# Patient Record
Sex: MALE | Race: WHITE | NOT HISPANIC OR LATINO | Employment: OTHER | URBAN - NONMETROPOLITAN AREA
[De-identification: names, ages, dates, MRNs, and addresses within clinical notes are randomized per-mention and may not be internally consistent; named-entity substitution may affect disease eponyms.]

---

## 2024-08-07 ENCOUNTER — APPOINTMENT (OUTPATIENT)
Dept: CT IMAGING | Facility: HOSPITAL | Age: 49
End: 2024-08-07

## 2024-08-07 ENCOUNTER — APPOINTMENT (OUTPATIENT)
Dept: GENERAL RADIOLOGY | Facility: HOSPITAL | Age: 49
End: 2024-08-07

## 2024-08-07 ENCOUNTER — HOSPITAL ENCOUNTER (EMERGENCY)
Facility: HOSPITAL | Age: 49
Discharge: OTHER INSTITUTION | End: 2024-08-08
Attending: STUDENT IN AN ORGANIZED HEALTH CARE EDUCATION/TRAINING PROGRAM
Payer: MEDICAID

## 2024-08-07 DIAGNOSIS — L02.31 ABSCESS, GLUTEAL, RIGHT: Primary | ICD-10-CM

## 2024-08-07 DIAGNOSIS — A41.9 SEPSIS, DUE TO UNSPECIFIED ORGANISM, UNSPECIFIED WHETHER ACUTE ORGAN DYSFUNCTION PRESENT: ICD-10-CM

## 2024-08-07 DIAGNOSIS — N28.9 ACUTE RENAL INSUFFICIENCY: ICD-10-CM

## 2024-08-07 DIAGNOSIS — R07.9 ACUTE CHEST PAIN: ICD-10-CM

## 2024-08-07 LAB
ALBUMIN SERPL-MCNC: 3.8 G/DL (ref 3.5–5.2)
ALBUMIN/GLOB SERPL: 1.1 G/DL
ALP SERPL-CCNC: 56 U/L (ref 39–117)
ALT SERPL W P-5'-P-CCNC: 53 U/L (ref 1–41)
AMPHET+METHAMPHET UR QL: NEGATIVE
AMPHETAMINES UR QL: NEGATIVE
ANION GAP SERPL CALCULATED.3IONS-SCNC: 13.3 MMOL/L (ref 5–15)
AST SERPL-CCNC: 55 U/L (ref 1–40)
BARBITURATES UR QL SCN: NEGATIVE
BASOPHILS # BLD AUTO: 0.12 10*3/MM3 (ref 0–0.2)
BASOPHILS NFR BLD AUTO: 0.4 % (ref 0–1.5)
BENZODIAZ UR QL SCN: NEGATIVE
BILIRUB SERPL-MCNC: 0.5 MG/DL (ref 0–1.2)
BUN SERPL-MCNC: 14 MG/DL (ref 6–20)
BUN/CREAT SERPL: 7.8 (ref 7–25)
BUPRENORPHINE SERPL-MCNC: NEGATIVE NG/ML
CALCIUM SPEC-SCNC: 9 MG/DL (ref 8.6–10.5)
CANNABINOIDS SERPL QL: POSITIVE
CHLORIDE SERPL-SCNC: 95 MMOL/L (ref 98–107)
CK SERPL-CCNC: 522 U/L (ref 20–200)
CO2 SERPL-SCNC: 25.7 MMOL/L (ref 22–29)
COCAINE UR QL: NEGATIVE
CREAT SERPL-MCNC: 1.8 MG/DL (ref 0.76–1.27)
D-LACTATE SERPL-SCNC: 1.4 MMOL/L (ref 0.5–2)
DEPRECATED RDW RBC AUTO: 49.4 FL (ref 37–54)
EGFRCR SERPLBLD CKD-EPI 2021: 45.6 ML/MIN/1.73
EOSINOPHIL # BLD AUTO: 0.5 10*3/MM3 (ref 0–0.4)
EOSINOPHIL NFR BLD AUTO: 1.8 % (ref 0.3–6.2)
ERYTHROCYTE [DISTWIDTH] IN BLOOD BY AUTOMATED COUNT: 17.3 % (ref 12.3–15.4)
FENTANYL UR-MCNC: POSITIVE NG/ML
GEN 5 2HR TROPONIN T REFLEX: 14 NG/L
GLOBULIN UR ELPH-MCNC: 3.6 GM/DL
GLUCOSE SERPL-MCNC: 105 MG/DL (ref 65–99)
HCT VFR BLD AUTO: 40.4 % (ref 37.5–51)
HGB BLD-MCNC: 13.8 G/DL (ref 13–17.7)
HOLD SPECIMEN: NORMAL
HOLD SPECIMEN: NORMAL
IMM GRANULOCYTES # BLD AUTO: 0.36 10*3/MM3 (ref 0–0.05)
IMM GRANULOCYTES NFR BLD AUTO: 1.3 % (ref 0–0.5)
LYMPHOCYTES # BLD AUTO: 2.07 10*3/MM3 (ref 0.7–3.1)
LYMPHOCYTES NFR BLD AUTO: 7.3 % (ref 19.6–45.3)
MAGNESIUM SERPL-MCNC: 1.7 MG/DL (ref 1.6–2.6)
MCH RBC QN AUTO: 27.7 PG (ref 26.6–33)
MCHC RBC AUTO-ENTMCNC: 34.2 G/DL (ref 31.5–35.7)
MCV RBC AUTO: 81 FL (ref 79–97)
METHADONE UR QL SCN: NEGATIVE
MONOCYTES # BLD AUTO: 2.23 10*3/MM3 (ref 0.1–0.9)
MONOCYTES NFR BLD AUTO: 7.9 % (ref 5–12)
NEUTROPHILS NFR BLD AUTO: 23.05 10*3/MM3 (ref 1.7–7)
NEUTROPHILS NFR BLD AUTO: 81.3 % (ref 42.7–76)
NRBC BLD AUTO-RTO: 0 /100 WBC (ref 0–0.2)
OPIATES UR QL: NEGATIVE
OXYCODONE UR QL SCN: NEGATIVE
PCP UR QL SCN: NEGATIVE
PLATELET # BLD AUTO: 432 10*3/MM3 (ref 140–450)
PMV BLD AUTO: 9.3 FL (ref 6–12)
POTASSIUM SERPL-SCNC: 4 MMOL/L (ref 3.5–5.2)
PROT SERPL-MCNC: 7.4 G/DL (ref 6–8.5)
RBC # BLD AUTO: 4.99 10*6/MM3 (ref 4.14–5.8)
SODIUM SERPL-SCNC: 134 MMOL/L (ref 136–145)
T4 FREE SERPL-MCNC: 1.34 NG/DL (ref 0.92–1.68)
TRICYCLICS UR QL SCN: NEGATIVE
TROPONIN T DELTA: -1 NG/L
TROPONIN T SERPL HS-MCNC: 15 NG/L
TSH SERPL DL<=0.05 MIU/L-ACNC: 1.32 UIU/ML (ref 0.27–4.2)
WBC NRBC COR # BLD AUTO: 28.33 10*3/MM3 (ref 3.4–10.8)
WHOLE BLOOD HOLD COAG: NORMAL
WHOLE BLOOD HOLD SPECIMEN: NORMAL

## 2024-08-07 PROCEDURE — 85025 COMPLETE CBC W/AUTO DIFF WBC: CPT | Performed by: STUDENT IN AN ORGANIZED HEALTH CARE EDUCATION/TRAINING PROGRAM

## 2024-08-07 PROCEDURE — 71275 CT ANGIOGRAPHY CHEST: CPT

## 2024-08-07 PROCEDURE — 25010000002 VANCOMYCIN 5 G RECONSTITUTED SOLUTION: Performed by: STUDENT IN AN ORGANIZED HEALTH CARE EDUCATION/TRAINING PROGRAM

## 2024-08-07 PROCEDURE — 83735 ASSAY OF MAGNESIUM: CPT | Performed by: STUDENT IN AN ORGANIZED HEALTH CARE EDUCATION/TRAINING PROGRAM

## 2024-08-07 PROCEDURE — 25010000002 CEFTRIAXONE PER 250 MG: Performed by: STUDENT IN AN ORGANIZED HEALTH CARE EDUCATION/TRAINING PROGRAM

## 2024-08-07 PROCEDURE — 25510000001 IOPAMIDOL 61 % SOLUTION: Performed by: STUDENT IN AN ORGANIZED HEALTH CARE EDUCATION/TRAINING PROGRAM

## 2024-08-07 PROCEDURE — 96367 TX/PROPH/DG ADDL SEQ IV INF: CPT

## 2024-08-07 PROCEDURE — 25010000002 MORPHINE PER 10 MG: Performed by: STUDENT IN AN ORGANIZED HEALTH CARE EDUCATION/TRAINING PROGRAM

## 2024-08-07 PROCEDURE — 87040 BLOOD CULTURE FOR BACTERIA: CPT | Performed by: STUDENT IN AN ORGANIZED HEALTH CARE EDUCATION/TRAINING PROGRAM

## 2024-08-07 PROCEDURE — 84484 ASSAY OF TROPONIN QUANT: CPT | Performed by: STUDENT IN AN ORGANIZED HEALTH CARE EDUCATION/TRAINING PROGRAM

## 2024-08-07 PROCEDURE — 96375 TX/PRO/DX INJ NEW DRUG ADDON: CPT

## 2024-08-07 PROCEDURE — 84443 ASSAY THYROID STIM HORMONE: CPT | Performed by: STUDENT IN AN ORGANIZED HEALTH CARE EDUCATION/TRAINING PROGRAM

## 2024-08-07 PROCEDURE — 99285 EMERGENCY DEPT VISIT HI MDM: CPT

## 2024-08-07 PROCEDURE — 83605 ASSAY OF LACTIC ACID: CPT | Performed by: STUDENT IN AN ORGANIZED HEALTH CARE EDUCATION/TRAINING PROGRAM

## 2024-08-07 PROCEDURE — 80053 COMPREHEN METABOLIC PANEL: CPT | Performed by: STUDENT IN AN ORGANIZED HEALTH CARE EDUCATION/TRAINING PROGRAM

## 2024-08-07 PROCEDURE — 71045 X-RAY EXAM CHEST 1 VIEW: CPT

## 2024-08-07 PROCEDURE — 25810000003 SODIUM CHLORIDE 0.9 % SOLUTION: Performed by: STUDENT IN AN ORGANIZED HEALTH CARE EDUCATION/TRAINING PROGRAM

## 2024-08-07 PROCEDURE — 96365 THER/PROPH/DIAG IV INF INIT: CPT

## 2024-08-07 PROCEDURE — 93005 ELECTROCARDIOGRAM TRACING: CPT | Performed by: STUDENT IN AN ORGANIZED HEALTH CARE EDUCATION/TRAINING PROGRAM

## 2024-08-07 PROCEDURE — 82550 ASSAY OF CK (CPK): CPT | Performed by: STUDENT IN AN ORGANIZED HEALTH CARE EDUCATION/TRAINING PROGRAM

## 2024-08-07 PROCEDURE — 80307 DRUG TEST PRSMV CHEM ANLYZR: CPT | Performed by: STUDENT IN AN ORGANIZED HEALTH CARE EDUCATION/TRAINING PROGRAM

## 2024-08-07 PROCEDURE — 36415 COLL VENOUS BLD VENIPUNCTURE: CPT

## 2024-08-07 PROCEDURE — 72193 CT PELVIS W/DYE: CPT

## 2024-08-07 PROCEDURE — 84439 ASSAY OF FREE THYROXINE: CPT | Performed by: STUDENT IN AN ORGANIZED HEALTH CARE EDUCATION/TRAINING PROGRAM

## 2024-08-07 RX ORDER — ASPIRIN 325 MG
325 TABLET ORAL ONCE
Status: COMPLETED | OUTPATIENT
Start: 2024-08-07 | End: 2024-08-07

## 2024-08-07 RX ORDER — VANCOMYCIN 2 GRAM/500 ML IN 0.9 % SODIUM CHLORIDE INTRAVENOUS
20 ONCE
Status: COMPLETED | OUTPATIENT
Start: 2024-08-07 | End: 2024-08-08

## 2024-08-07 RX ORDER — SODIUM CHLORIDE 0.9 % (FLUSH) 0.9 %
10 SYRINGE (ML) INJECTION AS NEEDED
Status: DISCONTINUED | OUTPATIENT
Start: 2024-08-07 | End: 2024-08-08 | Stop reason: HOSPADM

## 2024-08-07 RX ADMIN — VANCOMYCIN HYDROCHLORIDE 2000 MG: 5 INJECTION, POWDER, LYOPHILIZED, FOR SOLUTION INTRAVENOUS at 23:31

## 2024-08-07 RX ADMIN — SODIUM CHLORIDE 2000 MG: 9 INJECTION, SOLUTION INTRAVENOUS at 22:30

## 2024-08-07 RX ADMIN — IOPAMIDOL 100 ML: 612 INJECTION, SOLUTION INTRAVENOUS at 21:46

## 2024-08-07 RX ADMIN — ASPIRIN 325 MG: 325 TABLET ORAL at 21:27

## 2024-08-07 RX ADMIN — SODIUM CHLORIDE 1000 ML: 9 INJECTION, SOLUTION INTRAVENOUS at 21:27

## 2024-08-07 RX ADMIN — MORPHINE SULFATE 4 MG: 4 INJECTION, SOLUTION INTRAMUSCULAR; INTRAVENOUS at 22:28

## 2024-08-08 ENCOUNTER — HOSPITAL ENCOUNTER (EMERGENCY)
Facility: HOSPITAL | Age: 49
Discharge: HOME | End: 2024-08-08
Attending: STUDENT IN AN ORGANIZED HEALTH CARE EDUCATION/TRAINING PROGRAM
Payer: MEDICAID

## 2024-08-08 VITALS
TEMPERATURE: 98.1 F | SYSTOLIC BLOOD PRESSURE: 156 MMHG | BODY MASS INDEX: 32.96 KG/M2 | RESPIRATION RATE: 18 BRPM | DIASTOLIC BLOOD PRESSURE: 89 MMHG | HEIGHT: 67 IN | WEIGHT: 210 LBS | OXYGEN SATURATION: 97 % | HEART RATE: 93 BPM

## 2024-08-08 VITALS
WEIGHT: 210 LBS | HEART RATE: 96 BPM | BODY MASS INDEX: 32.96 KG/M2 | HEIGHT: 67 IN | OXYGEN SATURATION: 96 % | SYSTOLIC BLOOD PRESSURE: 164 MMHG | RESPIRATION RATE: 18 BRPM | DIASTOLIC BLOOD PRESSURE: 87 MMHG | TEMPERATURE: 98.9 F

## 2024-08-08 DIAGNOSIS — Z48.89 ENCOUNTER FOR POST SURGICAL WOUND CHECK: Primary | ICD-10-CM

## 2024-08-08 PROCEDURE — 96376 TX/PRO/DX INJ SAME DRUG ADON: CPT

## 2024-08-08 PROCEDURE — 36415 COLL VENOUS BLD VENIPUNCTURE: CPT

## 2024-08-08 PROCEDURE — 99282 EMERGENCY DEPT VISIT SF MDM: CPT

## 2024-08-08 PROCEDURE — 25010000002 MORPHINE PER 10 MG: Performed by: STUDENT IN AN ORGANIZED HEALTH CARE EDUCATION/TRAINING PROGRAM

## 2024-08-08 PROCEDURE — 25810000003 SODIUM CHLORIDE 0.9 % SOLUTION: Performed by: STUDENT IN AN ORGANIZED HEALTH CARE EDUCATION/TRAINING PROGRAM

## 2024-08-08 RX ADMIN — MORPHINE SULFATE 4 MG: 4 INJECTION, SOLUTION INTRAMUSCULAR; INTRAVENOUS at 00:42

## 2024-08-08 RX ADMIN — SODIUM CHLORIDE 1000 ML: 9 INJECTION, SOLUTION INTRAVENOUS at 00:41

## 2024-08-08 NOTE — ED NOTES
Pt reports self injecting testosterone in his right gluteus, noted to have very large fluctuant round red tender area, reports its been there for 2 days

## 2024-08-08 NOTE — ED PROVIDER NOTES
EMERGENCY DEPARTMENT ENCOUNTER    Pt Name: Leonides Kang  MRN: [id number]  Pt :   1975  Room Number:  19SF/19  Date of encounter:  2024  PCP: Provider, No Known  ED Provider: Rick Leiva PA-C    Historian: Patient, wife at bedside, nursing notes      HPI:  Chief Complaint: Abscess wound check        Context: Leonides Kang is a 49 y.o. male who presents to the ED c/o bilateral abscesses on his bilateral hips.  Patient states he was seen and evaluated here in this emergency department yesterday, sent to  for incision and drainage procedures which were performed at [facility] at 145 this morning he was then discharged home with wound care instructions however patient says he is having difficulty repacking and changing his wounds   And is requesting a wound recheck.  Patient denies any fever or chills, nausea or vomiting, redness or swelling or drainage of pus from the wounds.      PAST MEDICAL HISTORY  History reviewed. No pertinent past medical history.      PAST SURGICAL HISTORY  History reviewed. No pertinent surgical history.      FAMILY HISTORY  History reviewed. No pertinent family history.      SOCIAL HISTORY  Social History     Socioeconomic History    Marital status:    Substance and Sexual Activity    Drug use: Yes     Types: Marijuana         ALLERGIES  Patient has no known allergies.        REVIEW OF SYSTEMS  Review of Systems   Constitutional:  Negative for chills and fever.   HENT:  Negative for congestion and sore throat.    Respiratory:  Negative for cough and shortness of breath.    Cardiovascular:  Negative for chest pain.   Gastrointestinal:  Negative for abdominal pain, nausea and vomiting.   Genitourinary:  Negative for dysuria.   Musculoskeletal:  Negative for back pain.   Skin:  Positive for wound.   Neurological:  Negative for dizziness and headaches.   Psychiatric/Behavioral:  Negative for confusion.    All other systems reviewed and are  negative.         All systems reviewed and negative except for those discussed in HPI.       PHYSICAL EXAM    I have reviewed the triage vital signs and nursing notes.    ED Triage Vitals [08/08/24 1737]   Temp Heart Rate Resp BP SpO2   98.1 °F (36.7 °C) 93 18 114/83 97 %      Temp src Heart Rate Source Patient Position BP Location FiO2 (%)   Oral Monitor -- Left arm --       Physical Exam  Vitals and nursing note reviewed.   Constitutional:       General: He is not in acute distress.     Appearance: Normal appearance. He is not ill-appearing, toxic-appearing or diaphoretic.   HENT:      Head: Normocephalic and atraumatic.      Right Ear: External ear normal.      Left Ear: External ear normal.      Nose: No congestion or rhinorrhea.      Mouth/Throat:      Mouth: Mucous membranes are moist.      Pharynx: Oropharynx is clear.   Eyes:      Conjunctiva/sclera: Conjunctivae normal.   Cardiovascular:      Rate and Rhythm: Normal rate.      Heart sounds: Normal heart sounds.   Pulmonary:      Effort: Pulmonary effort is normal. No respiratory distress.      Breath sounds: Normal breath sounds. No wheezing.   Abdominal:      Tenderness: There is no abdominal tenderness.   Musculoskeletal:      Cervical back: Normal range of motion and neck supple.      Right lower leg: No edema.      Left lower leg: No edema.   Skin:     Findings: Wound present. No rash.          Neurological:      Mental Status: He is alert.             LAB RESULTS  Recent Results (from the past 24 hour(s))   Comprehensive Metabolic Panel    Collection Time: 08/07/24  9:13 PM    Specimen: Blood   Result Value Ref Range    Glucose 105 (H) 65 - 99 mg/dL    BUN 14 6 - 20 mg/dL    Creatinine 1.80 (H) 0.76 - 1.27 mg/dL    Sodium 134 (L) 136 - 145 mmol/L    Potassium 4.0 3.5 - 5.2 mmol/L    Chloride 95 (L) 98 - 107 mmol/L    CO2 25.7 22.0 - 29.0 mmol/L    Calcium 9.0 8.6 - 10.5 mg/dL    Total Protein 7.4 6.0 - 8.5 g/dL    Albumin 3.8 3.5 - 5.2 g/dL    ALT  (SGPT) 53 (H) 1 - 41 U/L    AST (SGOT) 55 (H) 1 - 40 U/L    Alkaline Phosphatase 56 39 - 117 U/L    Total Bilirubin 0.5 0.0 - 1.2 mg/dL    Globulin 3.6 gm/dL    A/G Ratio 1.1 g/dL    BUN/Creatinine Ratio 7.8 7.0 - 25.0    Anion Gap 13.3 5.0 - 15.0 mmol/L    eGFR 45.6 (L) >60.0 mL/min/1.73   High Sensitivity Troponin T    Collection Time: 08/07/24  9:13 PM    Specimen: Blood   Result Value Ref Range    HS Troponin T 15 <22 ng/L   Green Top (Gel)    Collection Time: 08/07/24  9:13 PM   Result Value Ref Range    Extra Tube Hold for add-ons.    Lavender Top    Collection Time: 08/07/24  9:13 PM   Result Value Ref Range    Extra Tube hold for add-on    Gold Top - SST    Collection Time: 08/07/24  9:13 PM   Result Value Ref Range    Extra Tube Hold for add-ons.    Light Blue Top    Collection Time: 08/07/24  9:13 PM   Result Value Ref Range    Extra Tube Hold for add-ons.    CBC Auto Differential    Collection Time: 08/07/24  9:13 PM    Specimen: Blood   Result Value Ref Range    WBC 28.33 (H) 3.40 - 10.80 10*3/mm3    RBC 4.99 4.14 - 5.80 10*6/mm3    Hemoglobin 13.8 13.0 - 17.7 g/dL    Hematocrit 40.4 37.5 - 51.0 %    MCV 81.0 79.0 - 97.0 fL    MCH 27.7 26.6 - 33.0 pg    MCHC 34.2 31.5 - 35.7 g/dL    RDW 17.3 (H) 12.3 - 15.4 %    RDW-SD 49.4 37.0 - 54.0 fl    MPV 9.3 6.0 - 12.0 fL    Platelets 432 140 - 450 10*3/mm3    Neutrophil % 81.3 (H) 42.7 - 76.0 %    Lymphocyte % 7.3 (L) 19.6 - 45.3 %    Monocyte % 7.9 5.0 - 12.0 %    Eosinophil % 1.8 0.3 - 6.2 %    Basophil % 0.4 0.0 - 1.5 %    Immature Grans % 1.3 (H) 0.0 - 0.5 %    Neutrophils, Absolute 23.05 (H) 1.70 - 7.00 10*3/mm3    Lymphocytes, Absolute 2.07 0.70 - 3.10 10*3/mm3    Monocytes, Absolute 2.23 (H) 0.10 - 0.90 10*3/mm3    Eosinophils, Absolute 0.50 (H) 0.00 - 0.40 10*3/mm3    Basophils, Absolute 0.12 0.00 - 0.20 10*3/mm3    Immature Grans, Absolute 0.36 (H) 0.00 - 0.05 10*3/mm3    nRBC 0.0 0.0 - 0.2 /100 WBC   CK    Collection Time: 08/07/24  9:13 PM     Specimen: Blood   Result Value Ref Range    Creatine Kinase 522 (H) 20 - 200 U/L   T4, Free    Collection Time: 08/07/24  9:13 PM    Specimen: Blood   Result Value Ref Range    Free T4 1.34 0.92 - 1.68 ng/dL   TSH    Collection Time: 08/07/24  9:13 PM    Specimen: Blood   Result Value Ref Range    TSH 1.320 0.270 - 4.200 uIU/mL   Magnesium    Collection Time: 08/07/24  9:13 PM    Specimen: Blood   Result Value Ref Range    Magnesium 1.7 1.6 - 2.6 mg/dL   Lactic Acid, Plasma    Collection Time: 08/07/24  9:13 PM    Specimen: Blood   Result Value Ref Range    Lactate 1.4 0.5 - 2.0 mmol/L   Urine Drug Screen - Urine, Clean Catch    Collection Time: 08/07/24 10:02 PM    Specimen: Urine, Clean Catch   Result Value Ref Range    THC, Screen, Urine Positive (A) Negative    Phencyclidine (PCP), Urine Negative Negative    Cocaine Screen, Urine Negative Negative    Methamphetamine, Ur Negative Negative    Opiate Screen Negative Negative    Amphetamine Screen, Urine Negative Negative    Benzodiazepine Screen, Urine Negative Negative    Tricyclic Antidepressants Screen Negative Negative    Methadone Screen, Urine Negative Negative    Barbiturates Screen, Urine Negative Negative    Oxycodone Screen, Urine Negative Negative    Buprenorphine, Screen, Urine Negative Negative   Fentanyl, Urine - Urine, Clean Catch    Collection Time: 08/07/24 10:02 PM    Specimen: Urine, Clean Catch   Result Value Ref Range    Fentanyl, Urine Positive (A) Negative   High Sensitivity Troponin T 2Hr    Collection Time: 08/07/24 11:06 PM    Specimen: Blood   Result Value Ref Range    HS Troponin T 14 <22 ng/L    Troponin T Delta -1 >=-4 - <+4 ng/L   Hepatitis C Antibody - ED    Collection Time: 08/08/24  1:56 AM    Specimen: Blood, Venous Line   Result Value Ref Range    HCV Qual Negative Negative   CK    Collection Time: 08/08/24  1:56 AM    Specimen: Blood, Venous Line   Result Value Ref Range    Creatine Kinase 444 (H) 49 - 320 U/L   Blood gas panel,  venous    Collection Time: 08/08/24  1:56 AM    Specimen: Blood, Venous Line   Result Value Ref Range    pH, Venous 7.39 7.32 - 7.43    pCO2, Capillary 42 40 - 55 mmHg    pO2, Venous 40 25 - 40 mmHg    SO2 72 65 - 80 %    Base Excess 0.3 -2.0 - 3.0 mmol/L    HCO3, Venous 26 22 - 26 mmol/L    Hematocrit, Blood Gas 40.8 40.0 - 51.0 %    Sodium 138 136 - 145 mmol/L    Potassium 3.5 (L) 3.6 - 4.9 mmol/L    Chloride 103 97 - 107 mmol/L    Glucose 79 74 - 99 mg/dL    Lactate, Arterial 1.0 0.5 - 2.2 mmol/L    Ionized Calcium, Arterial 4.4 (L) 4.6 - 5.1 mg/dL   PROTIME-INR    Collection Time: 08/08/24  1:56 AM    Specimen: Blood, Venous Line   Result Value Ref Range    Protime 14.8 (H) 12.0 - 14.3 sec    INR 1.2 (H) 0.9 - 1.1   CBC AND DIFFERENTIAL    Collection Time: 08/08/24  1:56 AM    Specimen: Blood, Venous Line   Result Value Ref Range    WBC 26.74 (H) 3.70 - 10.30 10*3/uL    RBC 4.80 4.60 - 6.10 10*6/uL    Hemoglobin 13.6 (L) 13.7 - 17.5 g/dL    Hematocrit 38.2 (L) 40.0 - 51.0 %    Platelets 429 (H) 155 - 369 10*3/uL    MCV 80 79 - 98 fL    MCH 28.3 26.0 - 32.0 pg    MCHC 35.6 (H) 30.7 - 35.5 g/dL    RDW 17.3 (H) 11.5 - 14.5 %    MPV 9.4 8.8 - 12.5 fL    nRBC 0.0 <=0.0 per 100 WBCs    Differential Type Automated     Neutrophil Rel % 79.0 %    Lymphocyte Rel % 8.0 %    Monocyte Rel % 9.0 %    Eosinophil % 3.0 %    Basophil Rel % 0.0 %    Immature Grans % 1.0 %    Neutrophils Absolute 21.18 (H) 1.60 - 6.10 10*3/uL    Lymphocytes Absolute 2.07 1.20 - 3.90 10*3/uL    Monocytes Absolute 2.33 (H) 0.30 - 0.90 10*3/uL    Eosinophils Absolute 0.75 (H) 0.00 - 0.50 10*3/uL    Basophils Absolute 0.08 0.00 - 0.10 10*3/uL    Immature Grans, Absolute 0.33 (H) 0.00 - 0.06 10*3/uL       If labs were ordered, I independently reviewed the results and considered them in treating the patient.        RADIOLOGY  XR Chest 1 View    Result Date: 8/8/2024  PROCEDURE: XR CHEST 1 VW-  HISTORY: Chest Pain Triage Protocol  COMPARISON: None.   FINDINGS: The heart is normal in size. The lungs are clear. The mediastinum is unremarkable. There is no pneumothorax.  There are no acute osseous abnormalities.      No acute cardiopulmonary process.      This report was signed and finalized on 8/8/2024 7:54 AM by Rhoda Taylor MD.      CT Angiogram Chest Pulmonary Embolism    Result Date: 8/7/2024  FINAL REPORT TECHNIQUE: null CLINICAL HISTORY: Tachycardia, chest pain, testosterone use COMPARISON: null FINDINGS: CT angiography chest with contrast. 3D Postprocessing. Comparison: None Findings: The pulmonary arteries are well opacified. Thoracic aorta is well opacified without aneurysm or dissection. The heart is not enlarged. There is no pericardial effusion. There are no enlarged mediastinal or hilar lymph nodes. The thyroid is unremarkable. No focal airspace consolidation, pleural effusion, or pneumothorax is seen. There are no worrisome pulmonary masses or nodules. Visualized upper abdomen is unremarkable. No acute fractures.     IMPRESSION: No pulmonary emboli. No acute parenchymal lung disease. Authenticated and Electronically Signed by Omari Duron MD on 08/07/2024 10:28:22 PM    CT Pelvis With Contrast    Result Date: 8/7/2024  FINAL REPORT TECHNIQUE: null CLINICAL HISTORY: Right gluteal abscess from testosterone injections COMPARISON: null FINDINGS: CT pelvis with contrast Comparison: None Findings: Irregular multiloculated subcutaneous abscess partially extending to the lateral aspect of the right gluteus guanaco. This measures approximately 6.4 x 3.8 x 5.4 cm in transverse, AP, and craniocaudal dimensions. There is adjacent subcutaneous edema. Additional smaller irregular multiloculated subcutaneous abscess overlying the lateral aspect of the left gluteus guanaco. Aggregate dimension measures approximately 4.4 x 2.9 x 5.5 cm in AP, transverse, and craniocaudal dimensions. There is adjacent subcutaneous edema. Ill-defined intramuscular hypodensity in the  lateral fibers of the bilateral gluteus guanaco likely reflecting reactive myositis with phlegmon not excluded. Pelvic contents unremarkable. Normal appendix. The bones are intact.     IMPRESSION: 1. Subcutaneous abscesses overlying the lateral aspect of the bilateral gluteus guanaco muscles with partial intramuscular extension on the right. 2. Probable reactive myositis in the periphery of the bilateral gluteus guanaco with phlegmon not excluded. Authenticated and Electronically Signed by Omari Duron MD on 08/07/2024 10:26:00 PM           PROCEDURES    Procedures    No orders to display       MEDICATIONS GIVEN IN ER    Medications - No data to display      MEDICAL DECISION MAKING, PROGRESS, and CONSULTS    All labs, if obtained, have been independently reviewed by me.  All radiology studies, if obtained, have been reviewed by me and the radiologist dictating the report.  All EKG's, if obtained, have been independently viewed and interpreted by me/my attending physician.      Discussion below represents my analysis of pertinent findings related to patient's condition, differential diagnosis, treatment plan and final disposition.    49-year-old male presenting with bilateral wounds on his gluteus guanaco muscles after a bedside surgical incision and drainage procedure performed at [facility] in the early hours of this a.m.  Patient is presenting for wound care as he feels unable to change his bandages or repack his wound.  The patient is awake alert oriented in no acute distress but in mild to moderate pain.  His vital signs are all stable and within normal limits he is afebrile nontachycardic and his pulse oximetry dependently interpreted by me is within normal limits at 97% on room air.    Examination of the patient's wound shows the right wound has [place] in place with no evidence of surrounding erythema there is some mild serosanguineous discharge, I flossed the wound and repack and she did with 4 x 4 gauze and  tape.  The left gluteus guanaco wound has several inches of iodoform gauze extending from the wound opening with no surrounding erythema and no active drainage of pus or bleeding.  I encouraged the patient that it was too early having been only 12 hours postprocedure to be read packaging his wound at this time and he can follow-up on an outpatient basis with  wound care which I encouraged him to contact through  general surgery tomorrow morning first thing and follow-up with them regarding their guidelines.  Encourage patient to continue taking his clindamycin medication and take his oxycodone as needed for pain which he already has prescribed to him.  Patient relates understanding and agreement with this plan of care.                     Differential diagnosis:    Differential diagnosis included but was not limited to encounter for wound check, abscess, cellulitis, wound infection      Additional sources:    - Discussed/ obtained information from independent historians: Patient's wife at bedside in addition to patient    - External (non-ED) record review: Emergency department visit records and general surgery procedure note from 0145 on 8/8/2024 from [facility], patient's aftercare instructions as instructed by  general surgery on his hospital after visit summary from [facility].    - Chronic or social conditions impacting care: None    Orders placed during this visit:  No orders of the defined types were placed in this encounter.        Additional orders considered but not ordered: None      ED Course:    Consultants: None                Shared Decision Making:  After my consideration of clinical presentation and any laboratory/radiology studies obtained, I discussed the findings with the patient/patient representative who is in agreement with the treatment plan and the final disposition.   Risks and benefits of discharge and/or observation/admission were discussed.       AS OF 18:30 EDT  VITALS:    BP - 156/89  HR - 93  TEMP - 98.1 °F (36.7 °C) (Oral)  O2 SATS - 97%                  DIAGNOSIS  Final diagnoses:   Encounter for post surgical wound check         DISPOSITION  Discharge      Please note that portions of this document were completed with voice recognition software.      Rick Leiva PA-C  08/08/24 [id number]

## 2024-08-08 NOTE — DISCHARGE INSTRUCTIONS
Follow-up with  general surgery tomorrow for further instructions regarding her wound care.  Return to the ER for any acute changes or worsening of your condition such as if you develop fever chills, bleeding or continue drainage of pus from the wound, or for any other concern

## 2024-08-08 NOTE — ED PROVIDER NOTES
EMERGENCY DEPARTMENT ENCOUNTER    Pt Name: Leonides Kang  MRN: [id number]  Pt :   1975  Room Number:    Date of encounter:  2024  PCP: Provider, No Known  ED Provider: Ziyad Benoit MD    Historian: Patient      HPI:  Chief Complaint: Chest pain        Context: Leonides Kang is a 49 y.o. male who presents to the ED c/o chest pain.  Patient states he was watching sunsetting had sudden onset of chest pain sensation in his central chest.  Also had leg pain/numbness radiating into his right arm at that time.  Denies any falls or trauma.  Denies ever having something like this happen before.  Thought it was acid reflux but it did not go away.  Patient also reports that he has a large abscess on his right buttocks from injection testosterone use.  Patient buys testosterone off the Internet.  States that the abscess/swelling has been there for at least 2 days.      PAST MEDICAL HISTORY  History reviewed. No pertinent past medical history.      PAST SURGICAL HISTORY  History reviewed. No pertinent surgical history.      FAMILY HISTORY  History reviewed. No pertinent family history.      SOCIAL HISTORY  Social History     Socioeconomic History    Marital status:    Substance and Sexual Activity    Drug use: Yes     Types: Marijuana         ALLERGIES  Patient has no known allergies.        REVIEW OF SYSTEMS  Review of Systems     All systems reviewed and negative except for those discussed in HPI.       PHYSICAL EXAM    I have reviewed the triage vital signs and nursing notes.    ED Triage Vitals [24 2106]   Temp Heart Rate Resp BP SpO2   98.7 °F (37.1 °C) 108 18 170/82 96 %      Temp src Heart Rate Source Patient Position BP Location FiO2 (%)   Oral Monitor Sitting Left arm --       Physical Exam    General:  Awake, alert, no acute distress  HEENT: Atraumatic, normocephalic, EOMI, PERRLA, mucous membranes moist  NECK:  Supple, atraumatic  Cardiovascular: Tachycardic, regular rhythm,  no murmurs, rubs, or gallops.  Extremities well perfused   Respiratory:  Regular rate, clear lungs to auscultation bilaterally.  No rhonchi, rales, wheezing  Abdominal:  Soft, nondistended, nontender.  No guarding or rebound.  No palpable masses  Extremity:  No visible bony abnormalities in all 4 extremities.  Full range of motion of all extremities.  Skin:  Warm and dry.  Large swollen indurated tissue area along the right posterior lateral buttocks to break approximately 15 cm x 15 cm.  Neuro:  AAOx3, GCS 15. Cranial nerves 2-12 grossly intact.  No focal strength or sensation deficits.  Psych:  Mood and affect appropriate.        LAB RESULTS  Recent Results (from the past 24 hour(s))   Comprehensive Metabolic Panel    Collection Time: 08/07/24  9:13 PM    Specimen: Blood   Result Value Ref Range    Glucose 105 (H) 65 - 99 mg/dL    BUN 14 6 - 20 mg/dL    Creatinine 1.80 (H) 0.76 - 1.27 mg/dL    Sodium 134 (L) 136 - 145 mmol/L    Potassium 4.0 3.5 - 5.2 mmol/L    Chloride 95 (L) 98 - 107 mmol/L    CO2 25.7 22.0 - 29.0 mmol/L    Calcium 9.0 8.6 - 10.5 mg/dL    Total Protein 7.4 6.0 - 8.5 g/dL    Albumin 3.8 3.5 - 5.2 g/dL    ALT (SGPT) 53 (H) 1 - 41 U/L    AST (SGOT) 55 (H) 1 - 40 U/L    Alkaline Phosphatase 56 39 - 117 U/L    Total Bilirubin 0.5 0.0 - 1.2 mg/dL    Globulin 3.6 gm/dL    A/G Ratio 1.1 g/dL    BUN/Creatinine Ratio 7.8 7.0 - 25.0    Anion Gap 13.3 5.0 - 15.0 mmol/L    eGFR 45.6 (L) >60.0 mL/min/1.73   High Sensitivity Troponin T    Collection Time: 08/07/24  9:13 PM    Specimen: Blood   Result Value Ref Range    HS Troponin T 15 <22 ng/L   Green Top (Gel)    Collection Time: 08/07/24  9:13 PM   Result Value Ref Range    Extra Tube Hold for add-ons.    Lavender Top    Collection Time: 08/07/24  9:13 PM   Result Value Ref Range    Extra Tube hold for add-on    Gold Top - SST    Collection Time: 08/07/24  9:13 PM   Result Value Ref Range    Extra Tube Hold for add-ons.    Light Blue Top    Collection Time:  08/07/24  9:13 PM   Result Value Ref Range    Extra Tube Hold for add-ons.    CBC Auto Differential    Collection Time: 08/07/24  9:13 PM    Specimen: Blood   Result Value Ref Range    WBC 28.33 (H) 3.40 - 10.80 10*3/mm3    RBC 4.99 4.14 - 5.80 10*6/mm3    Hemoglobin 13.8 13.0 - 17.7 g/dL    Hematocrit 40.4 37.5 - 51.0 %    MCV 81.0 79.0 - 97.0 fL    MCH 27.7 26.6 - 33.0 pg    MCHC 34.2 31.5 - 35.7 g/dL    RDW 17.3 (H) 12.3 - 15.4 %    RDW-SD 49.4 37.0 - 54.0 fl    MPV 9.3 6.0 - 12.0 fL    Platelets 432 140 - 450 10*3/mm3    Neutrophil % 81.3 (H) 42.7 - 76.0 %    Lymphocyte % 7.3 (L) 19.6 - 45.3 %    Monocyte % 7.9 5.0 - 12.0 %    Eosinophil % 1.8 0.3 - 6.2 %    Basophil % 0.4 0.0 - 1.5 %    Immature Grans % 1.3 (H) 0.0 - 0.5 %    Neutrophils, Absolute 23.05 (H) 1.70 - 7.00 10*3/mm3    Lymphocytes, Absolute 2.07 0.70 - 3.10 10*3/mm3    Monocytes, Absolute 2.23 (H) 0.10 - 0.90 10*3/mm3    Eosinophils, Absolute 0.50 (H) 0.00 - 0.40 10*3/mm3    Basophils, Absolute 0.12 0.00 - 0.20 10*3/mm3    Immature Grans, Absolute 0.36 (H) 0.00 - 0.05 10*3/mm3    nRBC 0.0 0.0 - 0.2 /100 WBC   CK    Collection Time: 08/07/24  9:13 PM    Specimen: Blood   Result Value Ref Range    Creatine Kinase 522 (H) 20 - 200 U/L   T4, Free    Collection Time: 08/07/24  9:13 PM    Specimen: Blood   Result Value Ref Range    Free T4 1.34 0.92 - 1.68 ng/dL   TSH    Collection Time: 08/07/24  9:13 PM    Specimen: Blood   Result Value Ref Range    TSH 1.320 0.270 - 4.200 uIU/mL   Magnesium    Collection Time: 08/07/24  9:13 PM    Specimen: Blood   Result Value Ref Range    Magnesium 1.7 1.6 - 2.6 mg/dL   Lactic Acid, Plasma    Collection Time: 08/07/24  9:13 PM    Specimen: Blood   Result Value Ref Range    Lactate 1.4 0.5 - 2.0 mmol/L   Urine Drug Screen - Urine, Clean Catch    Collection Time: 08/07/24 10:02 PM    Specimen: Urine, Clean Catch   Result Value Ref Range    THC, Screen, Urine Positive (A) Negative    Phencyclidine (PCP), Urine Negative  Negative    Cocaine Screen, Urine Negative Negative    Methamphetamine, Ur Negative Negative    Opiate Screen Negative Negative    Amphetamine Screen, Urine Negative Negative    Benzodiazepine Screen, Urine Negative Negative    Tricyclic Antidepressants Screen Negative Negative    Methadone Screen, Urine Negative Negative    Barbiturates Screen, Urine Negative Negative    Oxycodone Screen, Urine Negative Negative    Buprenorphine, Screen, Urine Negative Negative   Fentanyl, Urine - Urine, Clean Catch    Collection Time: 08/07/24 10:02 PM    Specimen: Urine, Clean Catch   Result Value Ref Range    Fentanyl, Urine Positive (A) Negative   High Sensitivity Troponin T 2Hr    Collection Time: 08/07/24 11:06 PM    Specimen: Blood   Result Value Ref Range    HS Troponin T 14 <22 ng/L    Troponin T Delta -1 >=-4 - <+4 ng/L       If labs were ordered, I independently reviewed the results and considered them in treating the patient.        RADIOLOGY  CT Angiogram Chest Pulmonary Embolism    Result Date: 8/7/2024  FINAL REPORT TECHNIQUE: null CLINICAL HISTORY: Tachycardia, chest pain, testosterone use COMPARISON: null FINDINGS: CT angiography chest with contrast. 3D Postprocessing. Comparison: None Findings: The pulmonary arteries are well opacified. Thoracic aorta is well opacified without aneurysm or dissection. The heart is not enlarged. There is no pericardial effusion. There are no enlarged mediastinal or hilar lymph nodes. The thyroid is unremarkable. No focal airspace consolidation, pleural effusion, or pneumothorax is seen. There are no worrisome pulmonary masses or nodules. Visualized upper abdomen is unremarkable. No acute fractures.     IMPRESSION: No pulmonary emboli. No acute parenchymal lung disease. Authenticated and Electronically Signed by Omari Duron MD on 08/07/2024 10:28:22 PM    CT Pelvis With Contrast    Result Date: 8/7/2024  FINAL REPORT TECHNIQUE: null CLINICAL HISTORY: Right gluteal abscess from  testosterone injections COMPARISON: null FINDINGS: CT pelvis with contrast Comparison: None Findings: Irregular multiloculated subcutaneous abscess partially extending to the lateral aspect of the right gluteus guanaco. This measures approximately 6.4 x 3.8 x 5.4 cm in transverse, AP, and craniocaudal dimensions. There is adjacent subcutaneous edema. Additional smaller irregular multiloculated subcutaneous abscess overlying the lateral aspect of the left gluteus guanaco. Aggregate dimension measures approximately 4.4 x 2.9 x 5.5 cm in AP, transverse, and craniocaudal dimensions. There is adjacent subcutaneous edema. Ill-defined intramuscular hypodensity in the lateral fibers of the bilateral gluteus guanaco likely reflecting reactive myositis with phlegmon not excluded. Pelvic contents unremarkable. Normal appendix. The bones are intact.     IMPRESSION: 1. Subcutaneous abscesses overlying the lateral aspect of the bilateral gluteus guanaco muscles with partial intramuscular extension on the right. 2. Probable reactive myositis in the periphery of the bilateral gluteus guanaco with phlegmon not excluded. Authenticated and Electronically Signed by Omari Duron MD on 08/07/2024 10:26:00 PM     I ordered and independently reviewed the above noted radiographic studies.     See radiologist's dictation for official interpretation.        PROCEDURES    Procedures    ECG 12 Lead ED Triage Standing Order; Chest Pain   Final Result          MEDICATIONS GIVEN IN ER    Medications   sodium chloride 0.9 % flush 10 mL (has no administration in time range)   sodium chloride 0.9 % flush 10 mL (has no administration in time range)   vancomycin IVPB 2000 mg in 0.9% Sodium Chloride 500 mL (2,000 mg Intravenous New Bag 8/7/24 6253)   sodium chloride 0.9 % bolus 1,000 mL (has no administration in time range)   morphine injection 4 mg (has no administration in time range)   sodium chloride 0.9 % bolus 1,000 mL (1,000 mL Intravenous New  Bag 8/7/24 2127)   aspirin tablet 325 mg (325 mg Oral Given 8/7/24 2127)   cefTRIAXone (ROCEPHIN) 2,000 mg in sodium chloride 0.9 % 100 mL IVPB-VTB (0 mg Intravenous Stopped 8/7/24 2310)   iopamidol (ISOVUE-300) 61 % injection 100 mL (100 mL Intravenous Given 8/7/24 2146)   morphine injection 4 mg (4 mg Intravenous Given 8/7/24 2228)         MEDICAL DECISION MAKING, PROGRESS, and CONSULTS    All labs, if obtained, have been independently reviewed by me.  All radiology studies, if obtained, have been reviewed by me and the radiologist dictating the report.  All EKG's, if obtained, have been independently viewed and interpreted by me.      Discussion below represents my analysis of pertinent findings related to patient's condition, differential diagnosis, treatment plan and final disposition.    Leonides Kang is a 49 y.o. male who presents to the ED c/o chest pain.  Hypertensive and tachycardic on arrival but mentating appropriately with GCS of 15.  Differential includes was not limited to myocardial infarction, acid reflux, musculoskeletal pain, pulmonary embolism, pneumonia, pneumothorax, drug reaction, caffeine reaction, adverse effect of exogenous testosterone.  Also concern for gluteal abscess.  Labs and EKG obtained along with chest x-ray.    EKG personally interpreted showing sinus tachycardia with rate of 120 but no evidence of acute ischemic changes or significant arrhythmia otherwise with normal intervals.    Patient given 1 L bolus of normal saline IV fluids.  Patient given 324 mg oral aspirin.    Labs show white blood cell count of 28.3.  Due to concern for sepsis, patient empirically given IV Rocephin along with obtained blood cultures and serum lactate.  Started on IV Rocephin 2 g.  Labs also show evidence of renal insufficiency with creatinine of 1.8.  Elevated CK of 522.     Chest x-ray personally interpreted showing no acute cardiopulmonary abnormalities.    CT pulmonary embolism study negative  for acute pathology in the chest.    CT of pelvis with contrast shows evidence of multiloculated subcutaneous abscess overlying lateral aspect of bilateral gluteus guanaco muscles with intramuscular extension on the right side and evidence of possible muscle breakdown/reactive myositis.    Antibiotics broadened out with IV vancomycin.  Along with second liter of normal saline IV fluids given.    Cardiac workup negative with no significant troponin elevation or delta troponin change.    Consulted general surgery on-call, Dr. Baig, who personally evaluated imaging and declined admission here stating due to extent and depth of abscess and muscle involvement would likely need tertiary care center.    Contacted  transfer center and spoke with physician on-call, Dr. Aponte, and general surgeon Dr. sOborn, who accepted patient as transfer to [facility] ER for further evaluation and treatment.                           Orders placed during this visit:  Orders Placed This Encounter   Procedures    Blood Culture - Blood,    Blood Culture - Blood,    XR Chest 1 View    CT Angiogram Chest Pulmonary Embolism    CT Pelvis With Contrast    [place] Draw    Comprehensive Metabolic Panel    High Sensitivity Troponin T    CBC Auto Differential    Urine Drug Screen - Urine, Clean Catch    CK    T4, Free    TSH    Magnesium    Lactic Acid, Plasma    High Sensitivity Troponin T 2Hr    Fentanyl, Urine - Urine, Clean Catch    NPO Diet NPO Type: Strict NPO    Undress & Gown    Continuous Pulse Oximetry    Oxygen Therapy- Nasal Cannula; Titrate 1-6 LPM Per SpO2; 90 - 95%    ECG 12 Lead ED Triage Standing Order; Chest Pain    ECG 12 Lead ED Triage Standing Order; Chest Pain    Insert Peripheral IV    Insert Peripheral IV    CBC & Differential    Green Top (Gel)    Lavender Top    Gold Top - SST    Light Blue Top         ED Course:    Consultants:                  Shared Decision Making:  After my consideration of clinical  presentation and any laboratory/radiology studies obtained, I discussed the findings with the patient/patient representative who is in agreement with the treatment plan and the final disposition.   Risks and benefits of discharge and/or observation/admission were discussed.      AS OF 00:32 EDT VITALS:    BP - 164/87  HR - 96  TEMP - 98.7 °F (37.1 °C) (Oral)  O2 SATS - 96%                  DIAGNOSIS  Final diagnoses:   Abscess, gluteal, right   Sepsis, due to unspecified organism, unspecified whether acute organ dysfunction present   Acute renal insufficiency   Acute chest pain         DISPOSITION  Transfer      Please note that portions of this document were completed with voice recognition software.        Ziyad Benoit MD  08/08/24 0159

## 2024-08-08 NOTE — ED NOTES
[facility] contacted at this time to request transfer of patient per provider. Awaiting call back at this time

## 2024-08-09 ENCOUNTER — HOSPITAL ENCOUNTER (EMERGENCY)
Facility: HOSPITAL | Age: 49
Discharge: HOME | End: 2024-08-09
Attending: STUDENT IN AN ORGANIZED HEALTH CARE EDUCATION/TRAINING PROGRAM
Payer: MEDICAID

## 2024-08-09 VITALS
DIASTOLIC BLOOD PRESSURE: 92 MMHG | SYSTOLIC BLOOD PRESSURE: 143 MMHG | TEMPERATURE: 97.2 F | OXYGEN SATURATION: 98 % | HEART RATE: 91 BPM | BODY MASS INDEX: 32.96 KG/M2 | WEIGHT: 210 LBS | RESPIRATION RATE: 19 BRPM | HEIGHT: 67 IN

## 2024-08-09 DIAGNOSIS — Z51.89 VISIT FOR WOUND CHECK: Primary | ICD-10-CM

## 2024-08-09 PROCEDURE — 99283 EMERGENCY DEPT VISIT LOW MDM: CPT

## 2024-08-09 PROCEDURE — 63710000001 ONDANSETRON ODT 4 MG TABLET DISPERSIBLE: Performed by: PHYSICIAN ASSISTANT

## 2024-08-09 RX ORDER — ONDANSETRON 4 MG/1
4 TABLET, ORALLY DISINTEGRATING ORAL ONCE
Status: COMPLETED | OUTPATIENT
Start: 2024-08-09 | End: 2024-08-09

## 2024-08-09 RX ORDER — HYDROCODONE BITARTRATE AND ACETAMINOPHEN 5; 325 MG/1; MG/1
1 TABLET ORAL ONCE
Status: COMPLETED | OUTPATIENT
Start: 2024-08-09 | End: 2024-08-09

## 2024-08-09 RX ORDER — LIDOCAINE 40 MG/G
1 CREAM TOPICAL AS NEEDED
Status: DISCONTINUED | OUTPATIENT
Start: 2024-08-09 | End: 2024-08-09 | Stop reason: HOSPADM

## 2024-08-09 RX ORDER — HYDROXYZINE PAMOATE 50 MG/1
50 CAPSULE ORAL 3 TIMES DAILY PRN
Qty: 15 CAPSULE | Refills: 0 | Status: SHIPPED | OUTPATIENT
Start: 2024-08-09

## 2024-08-09 RX ADMIN — HYDROCODONE BITARTRATE AND ACETAMINOPHEN 1 TABLET: 5; 325 TABLET ORAL at 17:00

## 2024-08-09 RX ADMIN — LIDOCAINE 4% 1 APPLICATION: 4 CREAM TOPICAL at 17:00

## 2024-08-09 RX ADMIN — ONDANSETRON 4 MG: 4 TABLET, ORALLY DISINTEGRATING ORAL at 17:00

## 2024-08-09 NOTE — ED PROVIDER NOTES
"Subjective:  Chief Complaint:  Wound check    History of Present Illness:  Patient is a 49-year-old male presenting to the ER with complaints of abscess to left hip/buttocks.  Patient was seen here and transferred to  for this.  He states that general surgery performed a bedside I&D and advised him to repack it every 12 hours.  Patient states he cannot tolerate this due to the severe pain.  Patient denies additional symptoms or complaints at this time and states he would just like to have the packing taken out and repacked.      Nurses Notes reviewed and agree, including vitals, allergies, social history and prior medical history.     REVIEW OF SYSTEMS: All systems reviewed and not pertinent unless noted.  Review of Systems   Skin:  Positive for wound.   All other systems reviewed and are negative.      No past medical history on file.    Allergies:    Patient has no known allergies.      No past surgical history on file.      Social History     Socioeconomic History    Marital status:    Substance and Sexual Activity    Drug use: Yes     Types: Marijuana         No family history on file.    Objective  Physical Exam:  /93 (BP Location: Left arm, Patient Position: Sitting)   Pulse 86   Temp 97.2 °F (36.2 °C) (Oral)   Resp 19   Ht 170.2 cm (67\")   Wt 95.3 kg (210 lb)   SpO2 97%   BMI 32.89 kg/m²      Physical Exam  Vitals and nursing note reviewed.   Constitutional:       General: He is not in acute distress.     Appearance: He is not toxic-appearing.   HENT:      Head: Normocephalic and atraumatic.      Right Ear: External ear normal.      Left Ear: External ear normal.      Nose: Nose normal.   Eyes:      Extraocular Movements: Extraocular movements intact.      Conjunctiva/sclera: Conjunctivae normal.   Cardiovascular:      Rate and Rhythm: Normal rate.   Pulmonary:      Effort: Pulmonary effort is normal. No respiratory distress.   Abdominal:      General: There is no distension. "   Musculoskeletal:         General: Normal range of motion.      Cervical back: Normal range of motion and neck supple.   Skin:     General: Skin is warm and dry.      Comments: There is a an incision with packing to the left hip/buttocks, wound appears well with no surrounding erythema or acute concerns   Neurological:      General: No focal deficit present.      Mental Status: He is alert and oriented to person, place, and time.   Psychiatric:         Mood and Affect: Mood normal.         Behavior: Behavior normal.         Wound Care    Date/Time: 8/9/2024 5:55 PM    Performed by: Mary Browning PA-C  Authorized by: Delfino Johnson DO    Consent:     Consent obtained:  Verbal    Consent given by:  Patient    Risks discussed:  Pain    Alternatives discussed:  Referral  Universal protocol:     Patient identity confirmed:  Verbally with patient  Sedation:     Sedation type: 5mg Norco and EMLA cream.  Anesthesia:     Anesthesia method:  Topical application    Topical anesthetic:  Lidocaine gel  Procedure details:     Indications: open wounds      Wound location:  Pelvis    Pelvis location:  L buttock    Wound age (days):  2    Wound surface area (sq cm):  2  Dressing:     Packing/drain action: packing change      Packing material:  Iodoform 1/4 inch    Dressing applied:  Tegaderm 2x3  Post-procedure details:     Procedure completion:  Tolerated well, no immediate complications      ED Course:         Lab Results (last 24 hours)       ** No results found for the last 24 hours. **             XR Chest 1 View    Result Date: 8/8/2024  PROCEDURE: XR CHEST 1 VW-  HISTORY: Chest Pain Triage Protocol  COMPARISON: None.  FINDINGS: The heart is normal in size. The lungs are clear. The mediastinum is unremarkable. There is no pneumothorax.  There are no acute osseous abnormalities.      Impression: No acute cardiopulmonary process.      This report was signed and finalized on 8/8/2024 7:54 AM by Rhoda Taylor MD.      CT  Angiogram Chest Pulmonary Embolism    Result Date: 8/7/2024  FINAL REPORT TECHNIQUE: null CLINICAL HISTORY: Tachycardia, chest pain, testosterone use COMPARISON: null FINDINGS: CT angiography chest with contrast. 3D Postprocessing. Comparison: None Findings: The pulmonary arteries are well opacified. Thoracic aorta is well opacified without aneurysm or dissection. The heart is not enlarged. There is no pericardial effusion. There are no enlarged mediastinal or hilar lymph nodes. The thyroid is unremarkable. No focal airspace consolidation, pleural effusion, or pneumothorax is seen. There are no worrisome pulmonary masses or nodules. Visualized upper abdomen is unremarkable. No acute fractures.     Impression: IMPRESSION: No pulmonary emboli. No acute parenchymal lung disease. Authenticated and Electronically Signed by Omari Duron MD on 08/07/2024 10:28:22 PM    CT Pelvis With Contrast    Result Date: 8/7/2024  FINAL REPORT TECHNIQUE: null CLINICAL HISTORY: Right gluteal abscess from testosterone injections COMPARISON: null FINDINGS: CT pelvis with contrast Comparison: None Findings: Irregular multiloculated subcutaneous abscess partially extending to the lateral aspect of the right gluteus guanaco. This measures approximately 6.4 x 3.8 x 5.4 cm in transverse, AP, and craniocaudal dimensions. There is adjacent subcutaneous edema. Additional smaller irregular multiloculated subcutaneous abscess overlying the lateral aspect of the left gluteus guanaco. Aggregate dimension measures approximately 4.4 x 2.9 x 5.5 cm in AP, transverse, and craniocaudal dimensions. There is adjacent subcutaneous edema. Ill-defined intramuscular hypodensity in the lateral fibers of the bilateral gluteus guanaco likely reflecting reactive myositis with phlegmon not excluded. Pelvic contents unremarkable. Normal appendix. The bones are intact.     Impression: IMPRESSION: 1. Subcutaneous abscesses overlying the lateral aspect of the bilateral  gluteus guanaco muscles with partial intramuscular extension on the right. 2. Probable reactive myositis in the periphery of the bilateral gluteus guanaco with phlegmon not excluded. Authenticated and Electronically Signed by Omari Duron MD on 08/07/2024 10:26:00 PM        MDM  Patient evaluated in the ER for wound check.  Patient was seen here 2 days ago and transferred to  for abscess involving the gluteus guanaco muscles with partial intramuscular extension and myositis.  He had bedside I&D performed by surgery and was advised to repack the wound every 12 hours.  Patient states he cannot tolerate this at home and is asking that we repacked the wound.  Patient states the pain is very severe.  Initial plan includes Norco, Zofran, Emla cream, will remove the packing and replace packing.    Packing was removed.  Wound was cleaned with chlorhexidine and irrigated with sterile saline syringes.  Wound was repacked with iodoform gauze and bandage placed over the wound.  Recommended follow-up for appointment with general surgery.  Patient was given prescription for Vistaril as he asked for something to help him sleep.  Precautions were given for return to the ER for any new or worsening symptoms.    Final diagnoses:   Visit for wound check          Mary Browning PA-C  08/09/24 [id number]

## 2024-08-09 NOTE — DISCHARGE INSTRUCTIONS
Follow wound care instructions given by general surgery.  Follow-up with general surgery for scheduled upcoming appointment.  Follow-up with your PCP as needed.  Return to the ER for new or worsening symptoms or acute concerns.

## 2024-08-11 ENCOUNTER — HOSPITAL ENCOUNTER (EMERGENCY)
Facility: HOSPITAL | Age: 49
Discharge: HOME | End: 2024-08-11
Attending: STUDENT IN AN ORGANIZED HEALTH CARE EDUCATION/TRAINING PROGRAM | Admitting: STUDENT IN AN ORGANIZED HEALTH CARE EDUCATION/TRAINING PROGRAM
Payer: MEDICAID

## 2024-08-11 VITALS
TEMPERATURE: 98.4 F | WEIGHT: 210 LBS | RESPIRATION RATE: 18 BRPM | HEIGHT: 67 IN | OXYGEN SATURATION: 98 % | SYSTOLIC BLOOD PRESSURE: 130 MMHG | BODY MASS INDEX: 32.96 KG/M2 | HEART RATE: 102 BPM | DIASTOLIC BLOOD PRESSURE: 83 MMHG

## 2024-08-11 DIAGNOSIS — Z51.89 ENCOUNTER FOR WOUND CARE: Primary | ICD-10-CM

## 2024-08-11 PROCEDURE — 99283 EMERGENCY DEPT VISIT LOW MDM: CPT

## 2024-08-11 RX ORDER — CLINDAMYCIN HYDROCHLORIDE 300 MG/1
300 CAPSULE ORAL 4 TIMES DAILY
COMMUNITY

## 2024-08-11 NOTE — ED PROVIDER NOTES
EMERGENCY DEPARTMENT ENCOUNTER    Pt Name: Leonides Kang  MRN: [id number]  Pt :   1975  Room Number:  02SF02  Date of encounter:  2024  PCP: Provider, No Known  ED Provider: Rick Leiva PA-C    Historian: Patient, wife at bedside, nursing notes      HPI:  Chief Complaint: Wound check        Context: Leonides Kang is a 49 y.o. male who presents to the ED requesting a wound check of his bilateral abscesses on his hips.  Patient is presented multiple times for wound care over the last week.  He had I&D procedure of 2 bilateral buttock abscesses by general surgery at [facility] last week.  Patient denies any redness or swelling around the wounds, drainage of pus from the wound that is abnormal, fever or chills, or any other complaint today.      PAST MEDICAL HISTORY  Past Medical History:   Diagnosis Date    Renal disease          PAST SURGICAL HISTORY  Past Surgical History:   Procedure Laterality Date    ROTATOR CUFF REPAIR Right          FAMILY HISTORY  History reviewed. No pertinent family history.      SOCIAL HISTORY  Social History     Socioeconomic History    Marital status:    Tobacco Use    Smoking status: Never   Substance and Sexual Activity    Drug use: Yes     Types: Marijuana         ALLERGIES  Patient has no known allergies.        REVIEW OF SYSTEMS  Review of Systems   Constitutional:  Negative for chills and fever.   HENT:  Negative for congestion and sore throat.    Respiratory:  Negative for cough and shortness of breath.    Cardiovascular:  Negative for chest pain.   Gastrointestinal:  Negative for abdominal pain, nausea and vomiting.   Genitourinary:  Negative for dysuria.   Musculoskeletal:  Negative for back pain.   Skin:  Positive for wound.   Neurological:  Negative for dizziness and headaches.   Psychiatric/Behavioral:  Negative for confusion.    All other systems reviewed and are negative.         All systems reviewed and negative except for those  discussed in HPI.       PHYSICAL EXAM    I have reviewed the triage vital signs and nursing notes.    ED Triage Vitals [08/11/24 1641]   Temp Heart Rate Resp BP SpO2   98.7 °F (37.1 °C) 101 18 139/86 96 %      Temp src Heart Rate Source Patient Position BP Location FiO2 (%)   Oral Monitor -- Left arm --       Physical Exam  Vitals and nursing note reviewed.   Constitutional:       General: He is not in acute distress.     Appearance: Normal appearance. He is not ill-appearing, toxic-appearing or diaphoretic.   HENT:      Head: Normocephalic and atraumatic.      Right Ear: External ear normal.      Left Ear: External ear normal.      Nose: No congestion or rhinorrhea.      Mouth/Throat:      Mouth: Mucous membranes are moist.      Pharynx: Oropharynx is clear.   Eyes:      Conjunctiva/sclera: Conjunctivae normal.   Cardiovascular:      Rate and Rhythm: Normal rate.      Heart sounds: Normal heart sounds.   Pulmonary:      Effort: Pulmonary effort is normal. No respiratory distress.      Breath sounds: Normal breath sounds. No wheezing.   Abdominal:      Tenderness: There is no abdominal tenderness.   Musculoskeletal:      Cervical back: Normal range of motion and neck supple.      Right lower leg: No edema.      Left lower leg: No edema.   Skin:     Findings: Wound present. No rash.          Neurological:      Mental Status: He is alert.             LAB RESULTS  No results found for this or any previous visit (from the past 24 hour(s)).    If labs were ordered, I independently reviewed the results and considered them in treating the patient.        RADIOLOGY  No Radiology Exams Resulted Within Past 24 Hours          PROCEDURES    Wound Care    Date/Time: 8/11/2024 5:29 PM    Performed by: Rick Leiva PA-C  Authorized by: Delfino Johnson DO    Consent:     Consent obtained:  Verbal    Consent given by:  Patient    Risks, benefits, and alternatives were discussed: yes      Risks discussed:  Bleeding, infection,  pain, nerve damage, incomplete drainage and poor cosmetic result    Alternatives discussed:  No treatment  Universal protocol:     Procedure explained and questions answered to patient or proxy's satisfaction: yes      Imaging studies available: no      Immediately prior to procedure, a time out was called: yes      Patient identity confirmed:  Arm band  Pre-procedure details:     Preparation: Patient was prepped and draped in usual sterile fashion    Sedation:     Sedation type:  None  Anesthesia:     Anesthesia method:  Local infiltration    Local anesthetic:  Lidocaine 1% w/o epi  Procedure details:     Indications: open wounds      Wound location:  Pelvis    Pelvis location:  L buttock    Wound age (days):  7    Wound surface area (sq cm):  2    Debridement performed: No    Dressing:     Packing/drain action: packing change      Packing material:  Iodoform 1/2 inch    Dressing: Nonadherent bandage.  Post-procedure details:     Procedure completion:  Tolerated well, no immediate complications      No orders to display       MEDICATIONS GIVEN IN ER    Medications - No data to display      MEDICAL DECISION MAKING, PROGRESS, and CONSULTS    All labs, if obtained, have been independently reviewed by me.  All radiology studies, if obtained, have been reviewed by me and the radiologist dictating the report.  All EKG's, if obtained, have been independently viewed and interpreted by me/my attending physician.      Discussion below represents my analysis of pertinent findings related to patient's condition, differential diagnosis, treatment plan and final disposition.    49-year-old male presenting for a wound recheck.  On exam he is upright alert oriented in no acute distress his vital signs as interpreted by me are all stable and within normal limits.  Examination of the wound on the right lateral hip shows Kouts in place with no active bleeding drainage of pus or surrounding erythema concerning for any wound infection.   I believe this wound is well-healing, I will bandage was reapplied.  Examination of the left lateral hip wound shows packing in place no surrounding cellulitis no active bleeding or drainage of pus.  I personally removed the packing, numbed the area with 1% lidocaine and repacked the wound and applied dressing.  Encourage patient to continue his clindamycin antibiotic as prescribed and follow-up with wound care in the next 2 days he already has a scheduled appointment for this.  Patient verbalized understanding and agreement this plan of care.                     Differential diagnosis:    Differential diagnosis included but was not limited to wound check, wound infection, cellulitis      Additional sources:    - Discussed/ obtained information from independent historians: Wife at bedside    - External (non-ED) record review: Procedure note from general surgery incision and drainage procedures performed on 8/8/2024    - Chronic or social conditions impacting care: None    Orders placed during this visit:  Orders Placed This Encounter   Procedures    Wound Care         Additional orders considered but not ordered: None      ED Course:    Consultants: None                Shared Decision Making:  After my consideration of clinical presentation and any laboratory/radiology studies obtained, I discussed the findings with the patient/patient representative who is in agreement with the treatment plan and the final disposition.   Risks and benefits of discharge and/or observation/admission were discussed.       AS OF 20:34 EDT VITALS:    BP - 130/83  HR - 102  TEMP - 98.4 °F (36.9 °C) (Oral)  O2 SATS - 98%                  DIAGNOSIS  Final diagnoses:   Encounter for wound care         DISPOSITION  Discharge      Please note that portions of this document were completed with voice recognition software.      Rick Leiva PA-C  08/11/24 2034

## 2024-08-11 NOTE — DISCHARGE INSTRUCTIONS
Continue your antibiotic regimen and wound care regimen and follow-up with general surgery in [facility] on your already scheduled appointment on August 15, 2024.    Return to the ER immediately if you develop fever chills, drainage of pus that is significant or bleeding from the wounds, or for any other concern of wound infection.

## 2024-08-12 LAB
BACTERIA SPEC AEROBE CULT: NORMAL
BACTERIA SPEC AEROBE CULT: NORMAL

## 2024-08-14 ENCOUNTER — HOSPITAL ENCOUNTER (EMERGENCY)
Facility: HOSPITAL | Age: 49
Discharge: HOME | End: 2024-08-14
Attending: EMERGENCY MEDICINE
Payer: MEDICAID

## 2024-08-14 VITALS
WEIGHT: 210 LBS | HEIGHT: 67 IN | OXYGEN SATURATION: 96 % | SYSTOLIC BLOOD PRESSURE: 158 MMHG | BODY MASS INDEX: 32.96 KG/M2 | HEART RATE: 106 BPM | TEMPERATURE: 98.3 F | RESPIRATION RATE: 20 BRPM | DIASTOLIC BLOOD PRESSURE: 81 MMHG

## 2024-08-14 DIAGNOSIS — Z51.89 ENCOUNTER FOR WOUND RE-CHECK: Primary | ICD-10-CM

## 2024-08-14 PROCEDURE — 25010000002 LIDOCAINE 1 % SOLUTION: Performed by: NURSE PRACTITIONER

## 2024-08-14 PROCEDURE — 99282 EMERGENCY DEPT VISIT SF MDM: CPT

## 2024-08-14 RX ORDER — LIDOCAINE HYDROCHLORIDE 10 MG/ML
10 INJECTION, SOLUTION INFILTRATION; PERINEURAL ONCE
Status: COMPLETED | OUTPATIENT
Start: 2024-08-14 | End: 2024-08-14

## 2024-08-14 RX ADMIN — LIDOCAINE HYDROCHLORIDE 10 ML: 10 INJECTION, SOLUTION INFILTRATION; PERINEURAL at 16:14

## 2024-08-14 NOTE — ED PROVIDER NOTES
"Subjective:  History of Present Illness:    Patient is a 49-year-old male without contributing health history.  Presents to the ER today for wound check and packing change.  Patient reports that he had abscess drained at  on 8/8/2024.  Returned to this ER several times to have packing change.  He denies drainage from the wound.  Denies fever.  Denies OTC medication home remedy.  Denies alleviating or exacerbating factors.    Nurses Notes reviewed and agree, including vitals, allergies, social history and prior medical history.     REVIEW OF SYSTEMS: All systems reviewed and not pertinent unless noted.  Review of Systems   Skin:  Positive for wound.   All other systems reviewed and are negative.      Past Medical History:   Diagnosis Date    Renal disease        Allergies:    Patient has no known allergies.      Past Surgical History:   Procedure Laterality Date    ROTATOR CUFF REPAIR Right          Social History     Socioeconomic History    Marital status:    Tobacco Use    Smoking status: Never   Substance and Sexual Activity    Drug use: Yes     Types: Marijuana         History reviewed. No pertinent family history.    Objective  Physical Exam:  /81 (BP Location: Left arm, Patient Position: Sitting)   Pulse 106   Temp 98.3 °F (36.8 °C) (Oral)   Resp 20   Ht 170.2 cm (67\")   Wt 95.3 kg (210 lb)   SpO2 96%   BMI 32.89 kg/m²      Physical Exam  Vitals and nursing note reviewed.   Constitutional:       Appearance: Normal appearance. He is normal weight.   HENT:      Head: Normocephalic and atraumatic.      Nose: Nose normal.      Mouth/Throat:      Mouth: Mucous membranes are moist.      Pharynx: Oropharynx is clear.   Eyes:      Extraocular Movements: Extraocular movements intact.      Conjunctiva/sclera: Conjunctivae normal.      Pupils: Pupils are equal, round, and reactive to light.   Cardiovascular:      Rate and Rhythm: Normal rate and regular rhythm.   Pulmonary:      Effort: Pulmonary " effort is normal.      Breath sounds: Normal breath sounds.   Abdominal:      General: Abdomen is flat. Bowel sounds are normal.      Palpations: Abdomen is soft.   Musculoskeletal:         General: Normal range of motion.      Cervical back: Normal range of motion and neck supple.   Skin:     General: Skin is warm and dry.      Capillary Refill: Capillary refill takes less than 2 seconds.      Comments: Wound to left hip appears to be healing well.  There is no localized erythema.  No drainage.  Wound bed is beefy.   Neurological:      General: No focal deficit present.      Mental Status: He is alert and oriented to person, place, and time. Mental status is at baseline.   Psychiatric:         Mood and Affect: Mood normal.         Behavior: Behavior normal.         Thought Content: Thought content normal.         Judgment: Judgment normal.         Wound Care    Date/Time: 8/14/2024 6:47 PM    Performed by: Fermin Shine APRN  Authorized by: Alex Moss MD    Consent:     Consent obtained:  Verbal    Consent given by:  Patient    Risks, benefits, and alternatives were discussed: yes      Risks discussed:  Bleeding, infection, pain, incomplete drainage, nerve damage and poor cosmetic result    Alternatives discussed:  No treatment, delayed treatment, alternative treatment, observation and referral  Universal protocol:     Procedure explained and questions answered to patient or proxy's satisfaction: yes      Relevant documents present and verified: yes      Test results available: no      Imaging studies available: no      Required blood products, implants, devices, and special equipment available: no      Site/side marked: yes      Immediately prior to procedure, a time out was called: yes      Patient identity confirmed:  Verbally with patient, hospital-assigned identification number and arm band  Pre-procedure details:     Preparation: Patient was prepped and draped in usual sterile fashion     Sedation:     Sedation type:  None  Anesthesia:     Anesthesia method:  None  Procedure details:     Indications: skin infection      Wound location:  Leg    Leg location:  L hip    Wound age (days):  6    Wound surface area (sq cm):  1  Dressing:     Packing/drain action: new packing      Packing material:  Iodoform 1/4 inch    Dressing applied:  2x2  Post-procedure details:     Procedure completion:  Tolerated      ED Course:         Lab Results (last 24 hours)       ** No results found for the last 24 hours. **             No radiology results from the last 24 hrs       MDM      Initial impression of presenting illness: Patient is a 49-year-old male without contributing health history.  Presents to the ER today for wound check and packing change.  Patient reports that he had abscess drained at  on 8/8/2024.  Returned to this ER several times to have packing change.  He denies drainage from the wound.  Denies fever.  Denies OTC medication home remedy.  Denies alleviating or exacerbating factors.    DDX: includes but is not limited to: Cellulitis, abscess, normal healing wound    Patient arrives stable with vitals interpreted by myself.     Pertinent features from physical exam: There is a pea-sized wound noted to left hip.  There is no drainage noted.  No adjacent erythema.  Wound bed appears to be red beefy.    Initial diagnostic plan: N/A    Results from initial plan were reviewed and interpreted by me revealing N/A    Diagnostic information from other sources: Chart review    Interventions / Re-evaluation: Vital signs stable throughout encounter.  Wound was assessed and repacked during this visit    Results/clinical rationale were discussed with patient    Consultations/Discussion of results with other physicians: N/A    Disposition plan: Patient is hemodynamically stable nontoxic-appearing appropriate discharge.  Will have him follow-up with  wound clinic tomorrow.  -----        Final diagnoses:    Encounter for wound re-check          Fermin Shine, APRN  08/14/24 5189

## 2024-08-18 ENCOUNTER — HOSPITAL ENCOUNTER (EMERGENCY)
Facility: HOSPITAL | Age: 49
Discharge: HOME | End: 2024-08-18
Attending: EMERGENCY MEDICINE | Admitting: EMERGENCY MEDICINE
Payer: MEDICAID

## 2024-08-18 VITALS
SYSTOLIC BLOOD PRESSURE: 143 MMHG | RESPIRATION RATE: 20 BRPM | HEART RATE: 106 BPM | OXYGEN SATURATION: 95 % | DIASTOLIC BLOOD PRESSURE: 79 MMHG | TEMPERATURE: 97.7 F | WEIGHT: 210 LBS | HEIGHT: 67 IN | BODY MASS INDEX: 32.96 KG/M2

## 2024-08-18 DIAGNOSIS — Z51.89 WOUND CHECK, ABSCESS: Primary | ICD-10-CM

## 2024-08-18 PROCEDURE — 99282 EMERGENCY DEPT VISIT SF MDM: CPT

## 2024-08-19 NOTE — ED PROVIDER NOTES
Pt Name: Leonides Kang  MRN: [id number]  : 1975  Date of Encounter: 2024    PCP: Provider, No Known      Subjective    History of Present Illness:    Chief Complaint: Bilateral hip abscess    History of Present Illness: Leonides Kang is a 49 y.o. male who presents to the ER complaining of bilateral hip abscess that started several weeks ago.  Patient has had bilateral hip abscesses had incision and drainage approximately 12 days ago has been seen here multiple times for wound care and repacking.  Patient states he missed his primary care provider appointment earlier in the day and is concerned about his wounds..      Triage Vitals:    ED Triage Vitals [24 1630]   Temp Heart Rate Resp BP SpO2   97.7 °F (36.5 °C) 106 20 143/79 95 %      Temp src Heart Rate Source Patient Position BP Location FiO2 (%)   -- -- -- -- --       Nurses Notes reviewed and agree, including vitals, allergies, social history and prior medical history.     Patient has no known allergies.    Past Medical History:   Diagnosis Date    Renal disease        Past Surgical History:   Procedure Laterality Date    ROTATOR CUFF REPAIR Right        Social History     Socioeconomic History    Marital status:    Tobacco Use    Smoking status: Never   Substance and Sexual Activity    Drug use: Yes     Types: Marijuana       History reviewed. No pertinent family history.    REVIEW OF SYSTEMS:     All systems reviewed and not pertinent unless noted.    Review of Systems   Skin:  Positive for wound.   All other systems reviewed and are negative.      Objective    Physical Exam  Vitals and nursing note reviewed.   Constitutional:       Appearance: Normal appearance.   HENT:      Head: Normocephalic and atraumatic.   Eyes:      Extraocular Movements: Extraocular movements intact.      Pupils: Pupils are equal, round, and reactive to light.   Cardiovascular:      Rate and Rhythm: Normal rate and regular rhythm.      Pulses:  Normal pulses.      Heart sounds: Normal heart sounds.   Pulmonary:      Effort: Pulmonary effort is normal.      Breath sounds: Normal breath sounds.   Abdominal:      General: Bowel sounds are normal.      Palpations: Abdomen is soft.   Musculoskeletal:         General: Normal range of motion.      Cervical back: Normal range of motion and neck supple.   Skin:     General: Skin is warm and dry.      Capillary Refill: Capillary refill takes less than 2 seconds.      Comments: Right hip wound clean dry and intact no discharge or erythema noted, vessel loop cut out of wound and will be allowed to heal.  Left hip wound had small amount of packing with no send on surrounding erythema or discharge.  Packing removed will be allowed to heal..   Neurological:      Mental Status: He is alert.      GCS: GCS eye subscore is 4. GCS verbal subscore is 5. GCS motor subscore is 6.      Sensory: Sensation is intact.      Motor: Motor function is intact.   Psychiatric:         Attention and Perception: Attention and perception normal.         Mood and Affect: Mood and affect normal.         Speech: Speech normal.                           Procedures    ED Course:    No orders to display            Orders placed during this visit:    No orders of the defined types were placed in this encounter.      LAB Results:    Lab Results (last 24 hours)       ** No results found for the last 24 hours. **             If labs were ordered, I have independently reviewed the results and considered them in the diagnosis and treatment plan for the patient    RADIOLOGY    No radiology results from the last 24 hrs     If I have ordered, I have independently reviewed the above noted radiographic studies.  Please see the radiologist dictation for the official interpretation    Medications given to patient in the ER    Medications - No data to display    AS OF 21:18 EDT VITALS:    BP - 143/79  HR - 106  TEMP - 97.7 °F (36.5 °C)  O2 SATS - 95%          Shared Decision Making: After my consideration of the clinical presentation and laboratory/radiology studies obtained, I have discussed the findings with the patient/patient representative who is in agreement with the treatment plan and final disposition. Risks and benefits of discharge and/or observation admission were discussed.  Final disposition of the patient will be discharged.  Patient is requested to follow-up with primary care provider and specialist in 1 week following final discharge.    Discharge Medications Prescribed:  No new home medications were prescribed during this ER visit    Medical Decision Making  : Leonides Kang is a 49 y.o. male who presents to the ER complaining of bilateral hip abscess that started several weeks ago.  Patient has had bilateral hip abscesses had incision and drainage approximately 12 days ago has been seen here multiple times for wound care and repacking.  Patient states he missed his primary care provider appointment earlier in the day and is concerned about his wounds..      DDX: includes but is not limited to: Wound care    Problems Addressed:  Wound check, abscess: acute illness or injury    Amount and/or Complexity of Data Reviewed  Discussion of management or test interpretation with external provider(s): Insert disc    Risk  Risk Details: I have discussed with patient the finding of the test preformed today. Patient has been diagnosed with wound check and will be discharged home.  Patient requested to follow-up with primary care provider within the next 7 days for reevaluation. Strict return precautions have been given and patient verbalizes understanding        Final diagnoses:   Wound check, abscess       Please note that portions of this document were completed using voice recognition dictation software.       Mikey Guzman, APRN  08/18/24 8072

## 2024-10-01 PROCEDURE — 85025 COMPLETE CBC W/AUTO DIFF WBC: CPT | Performed by: EMERGENCY MEDICINE

## 2024-10-01 PROCEDURE — 83690 ASSAY OF LIPASE: CPT | Performed by: EMERGENCY MEDICINE

## 2024-10-01 PROCEDURE — 80053 COMPREHEN METABOLIC PANEL: CPT | Performed by: EMERGENCY MEDICINE

## 2024-10-01 PROCEDURE — 99283 EMERGENCY DEPT VISIT LOW MDM: CPT

## 2024-10-01 RX ORDER — SODIUM CHLORIDE 0.9 % (FLUSH) 0.9 %
10 SYRINGE (ML) INJECTION AS NEEDED
Status: DISCONTINUED | OUTPATIENT
Start: 2024-10-01 | End: 2024-10-02 | Stop reason: HOSPADM

## 2024-10-02 ENCOUNTER — APPOINTMENT (OUTPATIENT)
Dept: GENERAL RADIOLOGY | Facility: HOSPITAL | Age: 49
End: 2024-10-02
Payer: COMMERCIAL

## 2024-10-02 ENCOUNTER — APPOINTMENT (OUTPATIENT)
Dept: CT IMAGING | Facility: HOSPITAL | Age: 49
End: 2024-10-02
Payer: COMMERCIAL

## 2024-10-02 ENCOUNTER — HOSPITAL ENCOUNTER (EMERGENCY)
Facility: HOSPITAL | Age: 49
Discharge: HOME | End: 2024-10-02
Attending: EMERGENCY MEDICINE
Payer: COMMERCIAL

## 2024-10-02 VITALS
DIASTOLIC BLOOD PRESSURE: 76 MMHG | WEIGHT: 199.08 LBS | RESPIRATION RATE: 18 BRPM | TEMPERATURE: 98 F | OXYGEN SATURATION: 96 % | HEART RATE: 93 BPM | BODY MASS INDEX: 31.25 KG/M2 | SYSTOLIC BLOOD PRESSURE: 118 MMHG | HEIGHT: 67 IN

## 2024-10-02 VITALS
HEART RATE: 100 BPM | OXYGEN SATURATION: 97 % | HEIGHT: 67 IN | SYSTOLIC BLOOD PRESSURE: 111 MMHG | RESPIRATION RATE: 20 BRPM | WEIGHT: 199 LBS | BODY MASS INDEX: 31.23 KG/M2 | DIASTOLIC BLOOD PRESSURE: 77 MMHG | TEMPERATURE: 98.8 F

## 2024-10-02 DIAGNOSIS — K08.89 PAIN, DENTAL: Primary | ICD-10-CM
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
## 2024-10-02 DIAGNOSIS — S13.4XXA WHIPLASH INJURY TO NECK, INITIAL ENCOUNTER: Primary | ICD-10-CM

## 2024-10-02 DIAGNOSIS — R04.0 EPISTAXIS: ICD-10-CM

## 2024-10-02 LAB
ALBUMIN SERPL-MCNC: 4.1 G/DL (ref 3.5–5.2)
ALBUMIN SERPL-MCNC: 4.1 G/DL (ref 3.5–5.2)
ALBUMIN/GLOB SERPL: 1.2 G/DL
ALBUMIN/GLOB SERPL: 1.2 G/DL
ALP SERPL-CCNC: 50 U/L (ref 39–117)
ALP SERPL-CCNC: 58 U/L (ref 39–117)
ALT SERPL W P-5'-P-CCNC: 18 U/L (ref 1–41)
ALT SERPL W P-5'-P-CCNC: 21 U/L (ref 1–41)
ANION GAP SERPL CALCULATED.3IONS-SCNC: 10.9 MMOL/L (ref 5–15)
ANION GAP SERPL CALCULATED.3IONS-SCNC: 15.1 MMOL/L (ref 5–15)
AST SERPL-CCNC: 32 U/L (ref 1–40)
AST SERPL-CCNC: 36 U/L (ref 1–40)
BACTERIA UR QL AUTO: ABNORMAL /HPF
BASOPHILS # BLD AUTO: 0.05 10*3/MM3 (ref 0–0.2)
BASOPHILS # BLD AUTO: 0.06 10*3/MM3 (ref 0–0.2)
BASOPHILS NFR BLD AUTO: 0.2 % (ref 0–1.5)
BASOPHILS NFR BLD AUTO: 0.3 % (ref 0–1.5)
BILIRUB SERPL-MCNC: 0.4 MG/DL (ref 0–1.2)
BILIRUB SERPL-MCNC: 0.5 MG/DL (ref 0–1.2)
BILIRUB UR QL STRIP: NEGATIVE
BUN SERPL-MCNC: 13 MG/DL (ref 6–20)
BUN SERPL-MCNC: 20 MG/DL (ref 6–20)
BUN/CREAT SERPL: 12.5 (ref 7–25)
BUN/CREAT SERPL: 8.2 (ref 7–25)
CALCIUM SPEC-SCNC: 8.8 MG/DL (ref 8.6–10.5)
CALCIUM SPEC-SCNC: 9 MG/DL (ref 8.6–10.5)
CHLORIDE SERPL-SCNC: 100 MMOL/L (ref 98–107)
CHLORIDE SERPL-SCNC: 99 MMOL/L (ref 98–107)
CLARITY UR: CLEAR
CO2 SERPL-SCNC: 19.9 MMOL/L (ref 22–29)
CO2 SERPL-SCNC: 26.1 MMOL/L (ref 22–29)
COLOR UR: YELLOW
CREAT SERPL-MCNC: 1.58 MG/DL (ref 0.76–1.27)
CREAT SERPL-MCNC: 1.6 MG/DL (ref 0.76–1.27)
DEPRECATED RDW RBC AUTO: 51.5 FL (ref 37–54)
DEPRECATED RDW RBC AUTO: 52.4 FL (ref 37–54)
EGFRCR SERPLBLD CKD-EPI 2021: 52.5 ML/MIN/1.73
EGFRCR SERPLBLD CKD-EPI 2021: 53.3 ML/MIN/1.73
EOSINOPHIL # BLD AUTO: 0.01 10*3/MM3 (ref 0–0.4)
EOSINOPHIL # BLD AUTO: 0.13 10*3/MM3 (ref 0–0.4)
EOSINOPHIL NFR BLD AUTO: 0 % (ref 0.3–6.2)
EOSINOPHIL NFR BLD AUTO: 0.6 % (ref 0.3–6.2)
ERYTHROCYTE [DISTWIDTH] IN BLOOD BY AUTOMATED COUNT: 17.2 % (ref 12.3–15.4)
ERYTHROCYTE [DISTWIDTH] IN BLOOD BY AUTOMATED COUNT: 17.2 % (ref 12.3–15.4)
GLOBULIN UR ELPH-MCNC: 3.4 GM/DL
GLOBULIN UR ELPH-MCNC: 3.4 GM/DL
GLUCOSE SERPL-MCNC: 101 MG/DL (ref 65–99)
GLUCOSE SERPL-MCNC: 106 MG/DL (ref 65–99)
GLUCOSE UR STRIP-MCNC: NEGATIVE MG/DL
HCT VFR BLD AUTO: 42.1 % (ref 37.5–51)
HCT VFR BLD AUTO: 45.3 % (ref 37.5–51)
HGB BLD-MCNC: 14.7 G/DL (ref 13–17.7)
HGB BLD-MCNC: 15.6 G/DL (ref 13–17.7)
HGB UR QL STRIP.AUTO: NEGATIVE
HOLD SPECIMEN: NORMAL
HOLD SPECIMEN: NORMAL
HYALINE CASTS UR QL AUTO: ABNORMAL /LPF
IMM GRANULOCYTES # BLD AUTO: 0.13 10*3/MM3 (ref 0–0.05)
IMM GRANULOCYTES # BLD AUTO: 0.16 10*3/MM3 (ref 0–0.05)
IMM GRANULOCYTES NFR BLD AUTO: 0.6 % (ref 0–0.5)
IMM GRANULOCYTES NFR BLD AUTO: 0.7 % (ref 0–0.5)
KETONES UR QL STRIP: ABNORMAL
LEUKOCYTE ESTERASE UR QL STRIP.AUTO: NEGATIVE
LIPASE SERPL-CCNC: 29 U/L (ref 13–60)
LYMPHOCYTES # BLD AUTO: 0.55 10*3/MM3 (ref 0.7–3.1)
LYMPHOCYTES # BLD AUTO: 1.89 10*3/MM3 (ref 0.7–3.1)
LYMPHOCYTES NFR BLD AUTO: 2.4 % (ref 19.6–45.3)
LYMPHOCYTES NFR BLD AUTO: 8.7 % (ref 19.6–45.3)
MCH RBC QN AUTO: 28.7 PG (ref 26.6–33)
MCH RBC QN AUTO: 29.1 PG (ref 26.6–33)
MCHC RBC AUTO-ENTMCNC: 34.4 G/DL (ref 31.5–35.7)
MCHC RBC AUTO-ENTMCNC: 34.9 G/DL (ref 31.5–35.7)
MCV RBC AUTO: 83.3 FL (ref 79–97)
MCV RBC AUTO: 83.4 FL (ref 79–97)
MONOCYTES # BLD AUTO: 0.59 10*3/MM3 (ref 0.1–0.9)
MONOCYTES # BLD AUTO: 2.19 10*3/MM3 (ref 0.1–0.9)
MONOCYTES NFR BLD AUTO: 10 % (ref 5–12)
MONOCYTES NFR BLD AUTO: 2.5 % (ref 5–12)
NEUTROPHILS NFR BLD AUTO: 17.41 10*3/MM3 (ref 1.7–7)
NEUTROPHILS NFR BLD AUTO: 21.91 10*3/MM3 (ref 1.7–7)
NEUTROPHILS NFR BLD AUTO: 79.9 % (ref 42.7–76)
NEUTROPHILS NFR BLD AUTO: 94.1 % (ref 42.7–76)
NITRITE UR QL STRIP: NEGATIVE
NRBC BLD AUTO-RTO: 0 /100 WBC (ref 0–0.2)
NRBC BLD AUTO-RTO: 0 /100 WBC (ref 0–0.2)
PH UR STRIP.AUTO: 5.5 [PH] (ref 5–8)
PLATELET # BLD AUTO: 300 10*3/MM3 (ref 140–450)
PLATELET # BLD AUTO: 321 10*3/MM3 (ref 140–450)
PMV BLD AUTO: 9.7 FL (ref 6–12)
PMV BLD AUTO: 9.8 FL (ref 6–12)
POTASSIUM SERPL-SCNC: 4.4 MMOL/L (ref 3.5–5.2)
POTASSIUM SERPL-SCNC: 4.8 MMOL/L (ref 3.5–5.2)
PROT SERPL-MCNC: 7.5 G/DL (ref 6–8.5)
PROT SERPL-MCNC: 7.5 G/DL (ref 6–8.5)
PROT UR QL STRIP: ABNORMAL
RBC # BLD AUTO: 5.05 10*6/MM3 (ref 4.14–5.8)
RBC # BLD AUTO: 5.44 10*6/MM3 (ref 4.14–5.8)
RBC # UR STRIP: ABNORMAL /HPF
REF LAB TEST METHOD: ABNORMAL
SODIUM SERPL-SCNC: 134 MMOL/L (ref 136–145)
SODIUM SERPL-SCNC: 137 MMOL/L (ref 136–145)
SP GR UR STRIP: >=1.03 (ref 1–1.03)
SQUAMOUS #/AREA URNS HPF: ABNORMAL /HPF
UROBILINOGEN UR QL STRIP: ABNORMAL
WBC # UR STRIP: ABNORMAL /HPF
WBC NRBC COR # BLD AUTO: 21.8 10*3/MM3 (ref 3.4–10.8)
WBC NRBC COR # BLD AUTO: 23.28 10*3/MM3 (ref 3.4–10.8)
WHOLE BLOOD HOLD COAG: NORMAL
WHOLE BLOOD HOLD SPECIMEN: NORMAL

## 2024-10-02 PROCEDURE — 71045 X-RAY EXAM CHEST 1 VIEW: CPT

## 2024-10-02 PROCEDURE — 80053 COMPREHEN METABOLIC PANEL: CPT | Performed by: NURSE PRACTITIONER

## 2024-10-02 PROCEDURE — 72125 CT NECK SPINE W/O DYE: CPT

## 2024-10-02 PROCEDURE — 72170 X-RAY EXAM OF PELVIS: CPT

## 2024-10-02 PROCEDURE — 25810000003 SODIUM CHLORIDE 0.9 % SOLUTION: Performed by: NURSE PRACTITIONER

## 2024-10-02 PROCEDURE — 70450 CT HEAD/BRAIN W/O DYE: CPT

## 2024-10-02 PROCEDURE — 25010000002 KETOROLAC TROMETHAMINE PER 15 MG: Performed by: NURSE PRACTITIONER

## 2024-10-02 PROCEDURE — 96374 THER/PROPH/DIAG INJ IV PUSH: CPT

## 2024-10-02 PROCEDURE — 73030 X-RAY EXAM OF SHOULDER: CPT

## 2024-10-02 PROCEDURE — 99284 EMERGENCY DEPT VISIT MOD MDM: CPT

## 2024-10-02 PROCEDURE — 85025 COMPLETE CBC W/AUTO DIFF WBC: CPT | Performed by: NURSE PRACTITIONER

## 2024-10-02 PROCEDURE — 81001 URINALYSIS AUTO W/SCOPE: CPT | Performed by: NURSE PRACTITIONER

## 2024-10-02 RX ORDER — CYCLOBENZAPRINE HCL 5 MG
5 TABLET ORAL 3 TIMES DAILY PRN
Qty: 15 TABLET | Refills: 0 | Status: SHIPPED | OUTPATIENT
Start: 2024-10-02

## 2024-10-02 RX ORDER — KETOROLAC TROMETHAMINE 30 MG/ML
30 INJECTION, SOLUTION INTRAMUSCULAR; INTRAVENOUS ONCE
Status: COMPLETED | OUTPATIENT
Start: 2024-10-02 | End: 2024-10-02

## 2024-10-02 RX ORDER — KETOROLAC TROMETHAMINE 30 MG/ML
30 INJECTION, SOLUTION INTRAMUSCULAR; INTRAVENOUS ONCE
Status: DISCONTINUED | OUTPATIENT
Start: 2024-10-02 | End: 2024-10-02

## 2024-10-02 RX ORDER — OXYMETAZOLINE HYDROCHLORIDE 0.05 G/100ML
1 SPRAY NASAL ONCE
Status: COMPLETED | OUTPATIENT
Start: 2024-10-02 | End: 2024-10-02

## 2024-10-02 RX ADMIN — KETOROLAC TROMETHAMINE 30 MG: 30 INJECTION, SOLUTION INTRAMUSCULAR; INTRAVENOUS at 20:47

## 2024-10-02 RX ADMIN — SODIUM CHLORIDE 500 ML: 9 INJECTION, SOLUTION INTRAVENOUS at 01:50

## 2024-10-02 RX ADMIN — NASAL DECONGESTANT 1 SPRAY: 0.05 SPRAY NASAL at 01:38

## 2024-10-02 NOTE — ED PROVIDER NOTES
Pt Name: Leonides Kang  MRN: [id number]  : 1975  Date of Encounter: 10/1/2024    PCP: Provider, No Known      Subjective    History of Present Illness:    Chief Complaint: Dental pain, nosebleed    History of Present Illness: Leonides Kang is a 49 y.o. male who presents to the ER complaining of dental pain, nosebleed that started this afternoon.  Patient had dental procedure where he had multiple teeth pulled on the upper jaw.  Patient states he also had nosebleed that started approximately 2 hours prior to arrival to the emergency room.  Patient states he was prescribed 20 oxycodones and is taken to with little to no pain relief..  Pain is described as Dull, Throbbing, Constant, and does not radiate  Patient rates pain as a 8 on a ten scale.    Triage Vitals:    ED Triage Vitals [10/01/24 2307]   Temp Heart Rate Resp BP SpO2   98.8 °F (37.1 °C) 110 18 135/78 95 %      Temp src Heart Rate Source Patient Position BP Location FiO2 (%)   Axillary Monitor Sitting Left arm --       Nurses Notes reviewed and agree, including vitals, allergies, social history and prior medical history.     Patient has no known allergies.    Past Medical History:   Diagnosis Date    Chronic kidney disease (CKD), stage IV (severe)     Renal disease        Past Surgical History:   Procedure Laterality Date    ROTATOR CUFF REPAIR Right        Social History     Socioeconomic History    Marital status:    Tobacco Use    Smoking status: Never    Smokeless tobacco: Never   Vaping Use    Vaping status: Never Used   Substance and Sexual Activity    Alcohol use: Never    Drug use: Yes     Types: Marijuana    Sexual activity: Defer       History reviewed. No pertinent family history.    REVIEW OF SYSTEMS:     All systems reviewed and not pertinent unless noted.    Review of Systems   HENT:  Positive for dental problem, drooling and nosebleeds.    All other systems reviewed and are negative.      Objective    Physical  Exam  Vitals and nursing note reviewed.   Constitutional:       Appearance: Normal appearance.   HENT:      Head: Normocephalic and atraumatic.      Nose:      Right Nostril: Epistaxis present.      Left Nostril: Epistaxis present.      Right Turbinates: Swollen.      Left Turbinates: Swollen.      Mouth/Throat:      Dentition: Abnormal dentition. Dental tenderness and gum lesions present.   Eyes:      Extraocular Movements: Extraocular movements intact.      Pupils: Pupils are equal, round, and reactive to light.   Cardiovascular:      Rate and Rhythm: Normal rate and regular rhythm.      Pulses: Normal pulses.      Heart sounds: Normal heart sounds.   Pulmonary:      Effort: Pulmonary effort is normal.      Breath sounds: Normal breath sounds.   Abdominal:      General: Bowel sounds are normal.      Palpations: Abdomen is soft.   Musculoskeletal:         General: Normal range of motion.      Cervical back: Normal range of motion and neck supple.   Skin:     General: Skin is warm and dry.      Capillary Refill: Capillary refill takes less than 2 seconds.   Neurological:      Mental Status: He is alert.      GCS: GCS eye subscore is 4. GCS verbal subscore is 5. GCS motor subscore is 6.      Sensory: Sensation is intact.      Motor: Motor function is intact.   Psychiatric:         Attention and Perception: Attention and perception normal.         Mood and Affect: Mood and affect normal.         Speech: Speech normal.                           Procedures    ED Course:    No orders to display            Orders placed during this visit:    Orders Placed This Encounter   Procedures    [place] Draw    Comprehensive Metabolic Panel    Lipase    CBC Auto Differential    NPO Diet NPO Type: Strict NPO    Undress & Gown    Insert Peripheral IV    CBC & Differential    Green Top (Gel)    Lavender Top    Gold Top - SST    Light Blue Top       LAB Results:    Lab Results (last 24 hours)       Procedure Component Value Units  Date/Time    CBC & Differential [[id number]]  (Abnormal) Collected: 10/01/24 2347    Specimen: Blood Updated: 10/02/24 0050    Narrative:      The following orders were created for panel order CBC & Differential.  Procedure                               Abnormality         Status                     ---------                               -----------         ------                     CBC Auto Differential[[id number]]        Abnormal            Final result                 Please view results for these tests on the individual orders.    Comprehensive Metabolic Panel [[id number]]  (Abnormal) Collected: 10/01/24 2347    Specimen: Blood Updated: 10/02/24 0011     Glucose 106 mg/dL      BUN 13 mg/dL      Creatinine 1.58 mg/dL      Sodium 134 mmol/L      Potassium 4.8 mmol/L      Chloride 99 mmol/L      CO2 19.9 mmol/L      Calcium 8.8 mg/dL      Total Protein 7.5 g/dL      Albumin 4.1 g/dL      ALT (SGPT) 21 U/L      AST (SGOT) 36 U/L      Alkaline Phosphatase 58 U/L      Total Bilirubin 0.4 mg/dL      Globulin 3.4 gm/dL      A/G Ratio 1.2 g/dL      BUN/Creatinine Ratio 8.2     Anion Gap 15.1 mmol/L      eGFR 53.3 mL/min/1.73     Narrative:      GFR Normal >60  Chronic Kidney Disease <60  Kidney Failure <15      Lipase [[id number]]  (Normal) Collected: 10/01/24 2347    Specimen: Blood Updated: 10/02/24 0011     Lipase 29 U/L     CBC Auto Differential [[id number]]  (Abnormal) Collected: 10/01/24 2347    Specimen: Blood Updated: 10/02/24 0050     WBC 23.28 10*3/mm3      RBC 5.44 10*6/mm3      Hemoglobin 15.6 g/dL      Hematocrit 45.3 %      MCV 83.3 fL      MCH 28.7 pg      MCHC 34.4 g/dL      RDW 17.2 %      RDW-SD 51.5 fl      MPV 9.7 fL      Platelets 321 10*3/mm3      Neutrophil % 94.1 %      Lymphocyte % 2.4 %      Monocyte % 2.5 %      Eosinophil % 0.0 %      Basophil % 0.3 %      Immature Grans % 0.7 %      Neutrophils, Absolute 21.91 10*3/mm3      Lymphocytes, Absolute 0.55 10*3/mm3      Monocytes, Absolute 0.59  10*3/mm3      Eosinophils, Absolute 0.01 10*3/mm3      Basophils, Absolute 0.06 10*3/mm3      Immature Grans, Absolute 0.16 10*3/mm3      nRBC 0.0 /100 WBC              If labs were ordered, I have independently reviewed the results and considered them in the diagnosis and treatment plan for the patient    RADIOLOGY    No radiology results from the last 24 hrs     If I have ordered, I have independently reviewed the above noted radiographic studies.  Please see the radiologist dictation for the official interpretation    Medications given to patient in the ER    Medications   sodium chloride 0.9 % flush 10 mL (has no administration in time range)   oxymetazoline (AFRIN) nasal spray 1 spray (1 spray Each Nare Given 10/2/24 0138)   sodium chloride 0.9 % bolus 500 mL (0 mL Intravenous Stopped 10/2/24 0207)       AS OF 02:56 EDT VITALS:    BP - 111/77  HR - 100  TEMP - 98.8 °F (37.1 °C) (Axillary)  O2 SATS - 97%         Shared Decision Making: After my consideration of the clinical presentation and laboratory/radiology studies obtained, I have discussed the findings with the patient/patient representative who is in agreement with the treatment plan and final disposition. Risks and benefits of discharge and/or observation admission were discussed.  Final disposition of the patient will be discharged home.  Patient is requested to follow-up with primary care provider and specialist in 1 week following final discharge.    Discharge Medications Prescribed:  No new home medications were prescribed during this ER visit    Medical Decision Making  Leonides Kang is a 49 y.o. male who presents to the ER complaining of dental pain, nosebleed that started this afternoon.  Patient had dental procedure where he had multiple teeth pulled on the upper jaw.  Patient states he also had nosebleed that started approximately 2 hours prior to arrival to the emergency room.  Patient states he was prescribed 20 oxycodones and is taken to  with little to no pain relief..  Pain is described as Dull, Throbbing, Constant, and does not radiate  Patient rates pain as a 8 on a ten scale.    DDX: includes but is not limited to: Dental pain, dental pain due to dental extraction, epistaxis    Problems Addressed:  Epistaxis: complicated acute illness or injury  Pain, dental: complicated acute illness or injury    Amount and/or Complexity of Data Reviewed  Labs: ordered. Decision-making details documented in ED Course.     Details: I have personally reviewed and documented all results  Discussion of management or test interpretation with external provider(s): Discussed assessment, treatment and plan with ER attending    Risk  OTC drugs.  Prescription drug management.  Risk Details: I have discussed with patient the finding of the test preformed today. Patient has been diagnosed with dental pain, epistaxis and will be discharged home.  Patient requested to follow-up with primary care provider, dentist within the next 7 days for reevaluation. Strict return precautions have been given and patient verbalizes understanding        Final diagnoses:   Pain, dental   Epistaxis       Please note that portions of this document were completed using voice recognition dictation software.       Mikey Guzman, APRN  10/02/24 0256
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
## 2024-12-19 ENCOUNTER — HOSPITAL ENCOUNTER (EMERGENCY)
Facility: HOSPITAL | Age: 49
Discharge: LEFT BEFORE DISCHARGE | End: 2024-12-19
Payer: COMMERCIAL

## 2024-12-19 VITALS
HEIGHT: 67 IN | DIASTOLIC BLOOD PRESSURE: 97 MMHG | HEART RATE: 91 BPM | TEMPERATURE: 97.9 F | BODY MASS INDEX: 32.96 KG/M2 | OXYGEN SATURATION: 98 % | SYSTOLIC BLOOD PRESSURE: 150 MMHG | WEIGHT: 210 LBS | RESPIRATION RATE: 18 BRPM

## 2024-12-19 PROCEDURE — 99211 OFF/OP EST MAY X REQ PHY/QHP: CPT

## 2024-12-20 ENCOUNTER — HOSPITAL ENCOUNTER (EMERGENCY)
Facility: HOSPITAL | Age: 49
Discharge: HOME | End: 2024-12-20
Attending: EMERGENCY MEDICINE
Payer: COMMERCIAL

## 2024-12-20 VITALS
RESPIRATION RATE: 18 BRPM | DIASTOLIC BLOOD PRESSURE: 94 MMHG | TEMPERATURE: 98.6 F | WEIGHT: 200 LBS | BODY MASS INDEX: 31.39 KG/M2 | OXYGEN SATURATION: 98 % | SYSTOLIC BLOOD PRESSURE: 136 MMHG | HEIGHT: 67 IN | HEART RATE: 93 BPM

## 2024-12-20 DIAGNOSIS — L02.31 ABSCESS, GLUTEAL, LEFT: Primary | ICD-10-CM

## 2024-12-20 DIAGNOSIS — L02.31 ABSCESS, GLUTEAL, RIGHT: ICD-10-CM

## 2024-12-20 PROCEDURE — 25010000002 LIDOCAINE 1 % SOLUTION: Performed by: EMERGENCY MEDICINE

## 2024-12-20 PROCEDURE — 99282 EMERGENCY DEPT VISIT SF MDM: CPT | Performed by: EMERGENCY MEDICINE

## 2024-12-20 RX ORDER — LIDOCAINE HYDROCHLORIDE 10 MG/ML
10 INJECTION, SOLUTION INFILTRATION; PERINEURAL ONCE
Status: COMPLETED | OUTPATIENT
Start: 2024-12-20 | End: 2024-12-20

## 2024-12-20 RX ORDER — CEPHALEXIN 500 MG/1
500 CAPSULE ORAL 2 TIMES DAILY
Qty: 14 CAPSULE | Refills: 0 | Status: SHIPPED | OUTPATIENT
Start: 2024-12-20

## 2024-12-20 RX ORDER — LIDOCAINE HYDROCHLORIDE 10 MG/ML
10 INJECTION, SOLUTION INFILTRATION; PERINEURAL ONCE
Status: DISCONTINUED | OUTPATIENT
Start: 2024-12-20 | End: 2024-12-20

## 2024-12-20 RX ORDER — SULFAMETHOXAZOLE AND TRIMETHOPRIM 800; 160 MG/1; MG/1
1 TABLET ORAL 2 TIMES DAILY
Qty: 14 TABLET | Refills: 0 | Status: SHIPPED | OUTPATIENT
Start: 2024-12-20

## 2024-12-20 RX ORDER — IBUPROFEN 600 MG/1
600 TABLET, FILM COATED ORAL 3 TIMES DAILY
Qty: 15 TABLET | Refills: 0 | Status: SHIPPED | OUTPATIENT
Start: 2024-12-20

## 2024-12-20 RX ADMIN — LIDOCAINE HYDROCHLORIDE 10 ML: 10 INJECTION, SOLUTION INFILTRATION; PERINEURAL at 07:15

## 2024-12-20 NOTE — ED PROVIDER NOTES
[facility] EMERGENCY DEPARTMENT  Emergency Department Encounter  Emergency Medicine Physician Note     Pt Name:Leonides Kang  MRN: [id number]  Birthdate 1975  Date of evaluation: 12/20/2024  PCP:  Hugo, Kathrin Known  Note Started: 6:04 AM EST      CHIEF COMPLAINT       Chief Complaint   Patient presents with    Abscess       HISTORY OF PRESENT ILLNESS  (Location/Symptom, Timing/Onset, Context/Setting, Quality, Duration, Modifying Factors, Severity.)      Leonides Kang is a 49 y.o. male who presents with bilateral gluteal abscesses.  Patient describes injecting steroids into his glutes.  Patient does not for weightlifting competitions.  Patient does have a history of MRSA.  Patient is on Bactrim.  Patient denies any fevers chills nausea or vomiting.  Patient describes worsening inflammation and concerns for abscess bilaterally.  Patient's had complicated abscesses in the past.    PAST MEDICAL / SURGICAL / SOCIAL / FAMILY HISTORY     Past Medical History:   Diagnosis Date    Chronic kidney disease (CKD), stage IV (severe)     Renal disease      No additional pertinent       Past Surgical History:   Procedure Laterality Date    ROTATOR CUFF REPAIR Right      No additional pertinent       Social History     Socioeconomic History    Marital status:    Tobacco Use    Smoking status: Never    Smokeless tobacco: Never   Vaping Use    Vaping status: Never Used   Substance and Sexual Activity    Alcohol use: Never    Drug use: Yes     Types: Marijuana    Sexual activity: Defer       History reviewed. No pertinent family history.    Allergies:  Patient has no known allergies.    Home Medications:  Prior to Admission medications    Medication Sig Start Date End Date Taking? Authorizing Provider   clindamycin (CLEOCIN) 300 MG capsule Take 1 capsule by mouth 4 (Four) Times a Day.    Provider, MD Shahid   cyclobenzaprine (FLEXERIL) 5 MG tablet Take 1 tablet by mouth 3 (Three) Times a  "Day As Needed for Muscle Spasms for up to 15 doses. 10/2/24   Mikey Guzman APRN   hydrOXYzine pamoate (VISTARIL) 50 MG capsule Take 1 capsule by mouth 3 (Three) Times a Day As Needed for Anxiety. 8/9/24   Mary Browning PA-C         REVIEW OF SYSTEMS       Review of Systems   Constitutional:  Negative for chills and fever.   Gastrointestinal:  Negative for abdominal pain, diarrhea, nausea and vomiting.   Skin:  Positive for color change and wound (bilateral abscess).       PHYSICAL EXAM      INITIAL VITALS:   /94 (BP Location: Left arm, Patient Position: Sitting)   Pulse 93   Temp 98.6 °F (37 °C) (Oral)   Resp 18   Ht 170.2 cm (67\")   Wt 90.7 kg (200 lb)   SpO2 98%   BMI 31.32 kg/m²     Physical Exam  Constitutional:       Appearance: Normal appearance.   HENT:      Head: Normocephalic and atraumatic.   Eyes:      Extraocular Movements: Extraocular movements intact.   Pulmonary:      Effort: Pulmonary effort is normal.   Musculoskeletal:      Right lower leg: No edema.      Left lower leg: No edema.   Skin:     General: Skin is warm and dry.      Findings: Erythema and lesion (bilateral gluteal abscess) present.   Neurological:      General: No focal deficit present.      Mental Status: He is alert and oriented to person, place, and time.   Psychiatric:         Mood and Affect: Mood normal.         Behavior: Behavior normal.           DDX/DIAGNOSTIC RESULTS / EMERGENCY DEPARTMENT COURSE / MDM     Differential Diagnosis included but not limited: Abscess, cellulitis   Diagnoses Considered but Do Not Suspect:  necrotizing fasciitis, severe infection including joint infection of the hips    Decision Rules/Scores utilized: N/A     Tests considered but not ordered and why:  N/A     MIPS: N/A     Code Status Discussion:  Not Discussed    Additional Patient Education Provided: None     Medical Decision Making    Medical Decision Making  Patient presenting with bilateral gluteal abscesses.  Patient is " obtain these from injecting steroids in his gluteal muscle.  Patient educated on the importance of not inject steroids, approximately 5 minutes spent in educating the patient on the risks of worsening infection from injecting steroids including necrotizing fasciitis.  Low concern for necrotizing fasciitis at this time.  Do feel that there are isolated deep tissue abscesses.  Incision and drainage performed with large amounts of purulent matter expressed.  Wounds were packed, patient educated on changing out packing.  Patient had concerns about changing of the packing and did not really want his significant other to help him with it.  Did inform him that if he is unable to change him to remove the packing in 48 hours to allow it to heal and not maintain the packing in there to be a source of infection.  Patient instructed to take Bactrim and Keflex for antibiotic management for his cellulitis and infection to.  Presented for worsening.  Patient structured to have close follow-up with primary care doctor or return to the emergency department.  Patient agreeable with this plan discharged home in stable condition.  See separate procedure notes.    Problems Addressed:  Abscess, gluteal, left: complicated acute illness or injury  Abscess, gluteal, right: complicated acute illness or injury    Amount and/or Complexity of Data Reviewed  External Data Reviewed: notes.    Risk  Prescription drug management.        See ED COURSE for additional MDM statements    EKG  None Performed     All EKG's are interpreted by the Emergency Department Physician who either signs or Co-signs this chart in the absence of a cardiologist.    Additional Scores                   EMERGENCY DEPARTMENT COURSE:    ED Course as of 12/21/24 0654   Fri Dec 20, 2024   0748 Bilateral gluteal abscesses incised and drained.  Packing placed.  Patient struck to drain back packing in 2 days.  Patient instructed to follow-up with primary care doctor.  Patient  instructed return for any worsening pain, worsening abscess, concerns for infection.  Patient instructed utilize antibiotics as prescribed. [CR]      ED Course User Index  [CR] Alex Booth DO       PROCEDURES:    Incision & Drainage    Date/Time: 12/21/2024 6:52 AM    Performed by: Alex Booth DO  Authorized by: Alex Booth DO    Consent:     Consent obtained:  Verbal    Risks discussed:  Bleeding, incomplete drainage and infection    Alternatives discussed:  No treatment  Location:     Type:  Abscess    Location:  Lower extremity    Lower extremity location:  Buttock    Buttock location:  L buttock  Pre-procedure details:     Skin preparation:  Chlorhexidine  Sedation:     Sedation type:  None  Anesthesia:     Anesthesia method:  Local infiltration    Local anesthetic:  Lidocaine 1% w/o epi  Procedure type:     Complexity:  Complex  Procedure details:     Ultrasound guidance: yes      Incision types:  Stab incision    Incision depth:  Subcutaneous    Wound management:  Probed and deloculated    Drainage:  Bloody and purulent    Drainage amount:  Copious    Wound treatment:  Wound left open    Packing materials:  1/4 in iodoform gauze  Post-procedure details:     Procedure completion:  Tolerated well, no immediate complications  Incision & Drainage    Date/Time: 12/21/2024 6:53 AM    Performed by: Alex Booth DO  Authorized by: Alex Booth DO    Consent:     Consent obtained:  Verbal    Risks discussed:  Bleeding, incomplete drainage and infection    Alternatives discussed:  No treatment  Location:     Type:  Abscess    Location:  Lower extremity    Lower extremity location:  Buttock    Buttock location:  R buttock  Pre-procedure details:     Skin preparation:  Chlorhexidine  Sedation:     Sedation type:  None  Anesthesia:     Anesthesia method:  Local infiltration    Local anesthetic:  Lidocaine 1% w/o epi  Procedure type:     Complexity:   "Simple  Procedure details:     Ultrasound guidance: yes      Incision types:  Stab incision and single straight    Incision depth:  Subcutaneous    Wound management:  Probed and deloculated    Drainage:  Bloody and purulent    Drainage amount:  Copious    Wound treatment:  Wound left open    Packing materials:  1/4 in iodoform gauze  Post-procedure details:     Procedure completion:  Tolerated well, no immediate complications      DATA FOR LAB AND RADIOLOGY TESTS ORDERED BELOW ARE REVIEWED BY THE ED CLINICIAN:    RADIOLOGY: All x-rays, CT, MRI, and formal ultrasound images (except ED bedside ultrasound) are read by the radiologist, see reports below, unless otherwise noted in MDM or here.  Reports below are reviewed by myself.  No orders to display       LABS: Lab orders shown below, the results are reviewed by myself, and all abnormals are listed below.  Labs Reviewed - No data to display    Vitals Reviewed:    Vitals:    12/20/24 0528   BP: 136/94   BP Location: Left arm   Patient Position: Sitting   Pulse: 93   Resp: 18   Temp: 98.6 °F (37 °C)   TempSrc: Oral   SpO2: 98%   Weight: 90.7 kg (200 lb)   Height: 170.2 cm (67\")       MEDICATIONS GIVEN TO PATIENT THIS ENCOUNTER:  Medications   lidocaine (XYLOCAINE) 1 % injection 10 mL (10 mL Injection Given 12/20/24 0715)       CONSULTS:  None    CRITICAL CARE:  There was significant risk of life threatening deterioration of patient's condition requiring my direct management. Critical care time 0 minutes, excluding any documented procedures.    FINAL IMPRESSION      1. Abscess, gluteal, left    2. Abscess, gluteal, right          DISPOSITION / PLAN     ED Disposition       ED Disposition   Discharge    Condition   Stable    Comment   --               PATIENT REFERRED TO:  [facility]IENT CONNE[facility] - [facility] [address]  [phone]  Schedule an appointment as soon as possible for a visit   Call to schedule primary care appointment in the next couple days., For " wound re-check    [facility] EMERGENCY DEPARTMENT  [address] [facility] [address] [id number] [phone]    Return the emerged department if you have worsening abscess, worsening signs of infection, including fevers chills nausea vomiting or any other concerns.      DISCHARGE MEDICATIONS:     Medication List        START taking these medications      cephalexin 500 MG capsule  Commonly known as: KEFLEX  Take 1 capsule by mouth 2 (Two) Times a Day.     ibuprofen 600 MG tablet  Commonly known as: ADVIL,MOTRIN  Take 1 tablet by mouth 3 (Three) Times a Day.     sulfamethoxazole-trimethoprim 800-160 MG per tablet  Commonly known as: BACTRIM DS,SEPTRA DS  Take 1 tablet by mouth 2 (Two) Times a Day.            CONTINUE taking these medications      clindamycin 300 MG capsule  Commonly known as: CLEOCIN     cyclobenzaprine 5 MG tablet  Commonly known as: FLEXERIL  Take 1 tablet by mouth 3 (Three) Times a Day As Needed for Muscle Spasms for up to 15 doses.     hydrOXYzine pamoate 50 MG capsule  Commonly known as: VISTARIL  Take 1 capsule by mouth 3 (Three) Times a Day As Needed for Anxiety.               Where to Get Your Medications        These medications were sent to [id number] - [place], [place] - [address] AT [facility] [address] - [phone] PH - [phone] FX  [address] [place] [address]      Phone: [phone]   cephalexin 500 MG capsule  ibuprofen 600 MG tablet  sulfamethoxazole-trimethoprim 800-160 MG per tablet         Electronically signed by Alex Booth DO, 12/20/24, 6:04 AM EST.    Emergency Medicine Physician  [facility]  (Please note that portions of thisnote were completed with a voice recognition program.  Efforts were made to edit the dictations but occasionally words are mis-transcribed.)           Alex Booth DO  12/21/24 0700

## 2024-12-20 NOTE — DISCHARGE INSTRUCTIONS
If you notice any concerning symptoms, please return to the ER immediately. These can include but are not limited to: worsening of you condition, fevers, chills, shortness of breath, vomiting, weakness of the extremities, changes in your mental status, numbness, pale extremities, or chest pain.     Take medications as prescribed, your pharmacist may have additional recommendations concerning these medications. If you are given an antibiotic, then make sure you get the prescription filled and take the antibiotics until finished, this is important to prevent antibiotic resistant diseases.  Drink plenty of water while taking the antibiotics.  Avoid drinking alcohol or drinks that have caffeine in it while taking antibiotics.      For pain use ibuprofen (Motrin) or acetaminophen (Tylenol), unless prescribed medications that also contain these medications.  You can take over the counter acetaminophen or ibuprofen, please follow the directions as dosages differ. Do not take ibuprofen if you have a history of peptic ulcers, kidney disease, bariatric surgery, or are currently pregnant.  Do not take Tylenol if you have a history of liver disease or alcohol use disorder.        THANK YOU!!! From [facility] Emergency Department    On behalf of the Emergency Department staff at [facility], I would like to thank you for giving us the opportunity to address your health care needs and concerns. We hope that during your visit, our service was delivered in a professional and caring manner. Please keep [facility] in mind as we walk with you down the path to your own personal wellness. Please expect follow-up phone calls concerning additional care and questions about your experience.      You have received additional information specific to your diagnosis in these discharge instructions, please read these fully.  Anytime you have been seen in the emergency department we recommend close follow up with your  primary care provider or specialist, please follow these directions as indicated.      If you have worsening pain, erythema, pain with ambulation, pain in your hip joints or any other concerns this to be signs of worsening infection is progressively moving.  Please take antibiotics as prescribed.  Please follow-up with a primary care doctor for reevaluation and wound check.  I provided additional packing material for you to change out daily, if you choose not to do that please remove your packing material in 2 days so it does not become a source of infection.

## 2025-08-22 ENCOUNTER — APPOINTMENT (OUTPATIENT)
Dept: CT IMAGING | Facility: HOSPITAL | Age: 50
End: 2025-08-22
Payer: COMMERCIAL

## 2025-08-22 ENCOUNTER — HOSPITAL ENCOUNTER (EMERGENCY)
Facility: HOSPITAL | Age: 50
Discharge: HOME | End: 2025-08-22
Attending: EMERGENCY MEDICINE
Payer: COMMERCIAL

## 2025-08-22 ENCOUNTER — HOSPITAL ENCOUNTER (EMERGENCY)
Facility: HOSPITAL | Age: 50
Discharge: HOME | End: 2025-08-23
Attending: EMERGENCY MEDICINE
Payer: COMMERCIAL

## 2025-08-23 ENCOUNTER — APPOINTMENT (OUTPATIENT)
Dept: CT IMAGING | Facility: HOSPITAL | Age: 50
End: 2025-08-23
Payer: COMMERCIAL